# Patient Record
Sex: MALE | Race: WHITE | NOT HISPANIC OR LATINO | URBAN - METROPOLITAN AREA
[De-identification: names, ages, dates, MRNs, and addresses within clinical notes are randomized per-mention and may not be internally consistent; named-entity substitution may affect disease eponyms.]

---

## 2020-06-29 ENCOUNTER — INPATIENT (INPATIENT)
Facility: HOSPITAL | Age: 35
LOS: 0 days | Discharge: LEFT BEFORE DISCHARGE | DRG: 543 | End: 2020-06-29
Attending: HOSPITALIST | Admitting: HOSPITALIST
Payer: COMMERCIAL

## 2020-06-29 ENCOUNTER — EMERGENCY (EMERGENCY)
Facility: HOSPITAL | Age: 35
LOS: 1 days | Discharge: ANOTHER HOSPITAL | End: 2020-06-29
Attending: EMERGENCY MEDICINE | Admitting: EMERGENCY MEDICINE
Payer: COMMERCIAL

## 2020-06-29 VITALS
SYSTOLIC BLOOD PRESSURE: 126 MMHG | DIASTOLIC BLOOD PRESSURE: 84 MMHG | WEIGHT: 199.96 LBS | OXYGEN SATURATION: 100 % | TEMPERATURE: 98 F | HEART RATE: 70 BPM | HEIGHT: 66 IN | RESPIRATION RATE: 20 BRPM

## 2020-06-29 VITALS
SYSTOLIC BLOOD PRESSURE: 125 MMHG | TEMPERATURE: 98 F | OXYGEN SATURATION: 100 % | HEART RATE: 75 BPM | DIASTOLIC BLOOD PRESSURE: 82 MMHG | RESPIRATION RATE: 18 BRPM

## 2020-06-29 VITALS
WEIGHT: 199.96 LBS | OXYGEN SATURATION: 99 % | HEART RATE: 109 BPM | TEMPERATURE: 99 F | RESPIRATION RATE: 16 BRPM | SYSTOLIC BLOOD PRESSURE: 166 MMHG | HEIGHT: 66 IN | DIASTOLIC BLOOD PRESSURE: 88 MMHG

## 2020-06-29 VITALS
HEART RATE: 74 BPM | RESPIRATION RATE: 16 BRPM | TEMPERATURE: 99 F | DIASTOLIC BLOOD PRESSURE: 86 MMHG | SYSTOLIC BLOOD PRESSURE: 143 MMHG | OXYGEN SATURATION: 96 %

## 2020-06-29 DIAGNOSIS — M86.9 OSTEOMYELITIS, UNSPECIFIED: ICD-10-CM

## 2020-06-29 LAB
ALBUMIN SERPL ELPH-MCNC: 3.6 G/DL — SIGNIFICANT CHANGE UP (ref 3.3–5)
ALP SERPL-CCNC: 144 U/L — HIGH (ref 40–120)
ALT FLD-CCNC: 14 U/L — SIGNIFICANT CHANGE UP (ref 10–45)
ANION GAP SERPL CALC-SCNC: 11 MMOL/L — SIGNIFICANT CHANGE UP (ref 5–17)
APTT BLD: 33.2 SEC — SIGNIFICANT CHANGE UP (ref 27.5–35.5)
APTT BLD: 35.4 SEC — SIGNIFICANT CHANGE UP (ref 27.5–35.5)
AST SERPL-CCNC: 13 U/L — SIGNIFICANT CHANGE UP (ref 10–40)
BASOPHILS # BLD AUTO: 0.01 K/UL — SIGNIFICANT CHANGE UP (ref 0–0.2)
BASOPHILS # BLD AUTO: 0.03 K/UL — SIGNIFICANT CHANGE UP (ref 0–0.2)
BASOPHILS NFR BLD AUTO: 0.1 % — SIGNIFICANT CHANGE UP (ref 0–2)
BASOPHILS NFR BLD AUTO: 0.4 % — SIGNIFICANT CHANGE UP (ref 0–2)
BILIRUB SERPL-MCNC: 0.2 MG/DL — SIGNIFICANT CHANGE UP (ref 0.2–1.2)
BLD GP AB SCN SERPL QL: NEGATIVE — SIGNIFICANT CHANGE UP
BUN SERPL-MCNC: 8 MG/DL — SIGNIFICANT CHANGE UP (ref 7–23)
CALCIUM SERPL-MCNC: 9.1 MG/DL — SIGNIFICANT CHANGE UP (ref 8.4–10.5)
CHLORIDE SERPL-SCNC: 101 MMOL/L — SIGNIFICANT CHANGE UP (ref 96–108)
CO2 SERPL-SCNC: 26 MMOL/L — SIGNIFICANT CHANGE UP (ref 22–31)
CREAT SERPL-MCNC: 0.79 MG/DL — SIGNIFICANT CHANGE UP (ref 0.5–1.3)
CRP SERPL-MCNC: 3.11 MG/DL — HIGH (ref 0–0.4)
EOSINOPHIL # BLD AUTO: 0.05 K/UL — SIGNIFICANT CHANGE UP (ref 0–0.5)
EOSINOPHIL # BLD AUTO: 0.08 K/UL — SIGNIFICANT CHANGE UP (ref 0–0.5)
EOSINOPHIL NFR BLD AUTO: 0.6 % — SIGNIFICANT CHANGE UP (ref 0–6)
EOSINOPHIL NFR BLD AUTO: 1 % — SIGNIFICANT CHANGE UP (ref 0–6)
GAS PNL BLDV: SIGNIFICANT CHANGE UP
GLUCOSE SERPL-MCNC: 122 MG/DL — HIGH (ref 70–99)
HCT VFR BLD CALC: 38.7 % — LOW (ref 39–50)
HCT VFR BLD CALC: 40 % — SIGNIFICANT CHANGE UP (ref 39–50)
HGB BLD-MCNC: 11.5 G/DL — LOW (ref 13–17)
HGB BLD-MCNC: 12.2 G/DL — LOW (ref 13–17)
IMM GRANULOCYTES NFR BLD AUTO: 0.3 % — SIGNIFICANT CHANGE UP (ref 0–1.5)
IMM GRANULOCYTES NFR BLD AUTO: 0.4 % — SIGNIFICANT CHANGE UP (ref 0–1.5)
INR BLD: 1.11 RATIO — SIGNIFICANT CHANGE UP (ref 0.88–1.16)
INR BLD: 1.15 RATIO — SIGNIFICANT CHANGE UP (ref 0.88–1.16)
LACTATE SERPL-SCNC: 1.1 MMOL/L — SIGNIFICANT CHANGE UP (ref 0.7–2)
LYMPHOCYTES # BLD AUTO: 1.26 K/UL — SIGNIFICANT CHANGE UP (ref 1–3.3)
LYMPHOCYTES # BLD AUTO: 1.48 K/UL — SIGNIFICANT CHANGE UP (ref 1–3.3)
LYMPHOCYTES # BLD AUTO: 15 % — SIGNIFICANT CHANGE UP (ref 13–44)
LYMPHOCYTES # BLD AUTO: 16.4 % — SIGNIFICANT CHANGE UP (ref 13–44)
MCHC RBC-ENTMCNC: 22.3 PG — LOW (ref 27–34)
MCHC RBC-ENTMCNC: 22.4 PG — LOW (ref 27–34)
MCHC RBC-ENTMCNC: 29.7 GM/DL — LOW (ref 32–36)
MCHC RBC-ENTMCNC: 30.5 GM/DL — LOW (ref 32–36)
MCV RBC AUTO: 73.4 FL — LOW (ref 80–100)
MCV RBC AUTO: 75.1 FL — LOW (ref 80–100)
MONOCYTES # BLD AUTO: 0.42 K/UL — SIGNIFICANT CHANGE UP (ref 0–0.9)
MONOCYTES # BLD AUTO: 0.68 K/UL — SIGNIFICANT CHANGE UP (ref 0–0.9)
MONOCYTES NFR BLD AUTO: 4.6 % — SIGNIFICANT CHANGE UP (ref 2–14)
MONOCYTES NFR BLD AUTO: 8.1 % — SIGNIFICANT CHANGE UP (ref 2–14)
NEUTROPHILS # BLD AUTO: 6.34 K/UL — SIGNIFICANT CHANGE UP (ref 1.8–7.4)
NEUTROPHILS # BLD AUTO: 7.05 K/UL — SIGNIFICANT CHANGE UP (ref 1.8–7.4)
NEUTROPHILS NFR BLD AUTO: 75.1 % — SIGNIFICANT CHANGE UP (ref 43–77)
NEUTROPHILS NFR BLD AUTO: 78 % — HIGH (ref 43–77)
NRBC # BLD: 0 /100 WBCS — SIGNIFICANT CHANGE UP (ref 0–0)
NRBC # BLD: 0 /100 WBCS — SIGNIFICANT CHANGE UP (ref 0–0)
PLATELET # BLD AUTO: 314 K/UL — SIGNIFICANT CHANGE UP (ref 150–400)
PLATELET # BLD AUTO: 325 K/UL — SIGNIFICANT CHANGE UP (ref 150–400)
POTASSIUM SERPL-MCNC: 3.9 MMOL/L — SIGNIFICANT CHANGE UP (ref 3.5–5.3)
POTASSIUM SERPL-SCNC: 3.9 MMOL/L — SIGNIFICANT CHANGE UP (ref 3.5–5.3)
PROT SERPL-MCNC: 7.6 G/DL — SIGNIFICANT CHANGE UP (ref 6–8.3)
PROTHROM AB SERPL-ACNC: 12.9 SEC — SIGNIFICANT CHANGE UP (ref 10.6–13.6)
PROTHROM AB SERPL-ACNC: 13 SEC — SIGNIFICANT CHANGE UP (ref 10.6–13.6)
RBC # BLD: 5.15 M/UL — SIGNIFICANT CHANGE UP (ref 4.2–5.8)
RBC # BLD: 5.45 M/UL — SIGNIFICANT CHANGE UP (ref 4.2–5.8)
RBC # FLD: 15.8 % — HIGH (ref 10.3–14.5)
RBC # FLD: 15.9 % — HIGH (ref 10.3–14.5)
RH IG SCN BLD-IMP: POSITIVE — SIGNIFICANT CHANGE UP
SARS-COV-2 RNA SPEC QL NAA+PROBE: SIGNIFICANT CHANGE UP
SODIUM SERPL-SCNC: 138 MMOL/L — SIGNIFICANT CHANGE UP (ref 135–145)
WBC # BLD: 8.42 K/UL — SIGNIFICANT CHANGE UP (ref 3.8–10.5)
WBC # BLD: 9.04 K/UL — SIGNIFICANT CHANGE UP (ref 3.8–10.5)
WBC # FLD AUTO: 8.42 K/UL — SIGNIFICANT CHANGE UP (ref 3.8–10.5)
WBC # FLD AUTO: 9.04 K/UL — SIGNIFICANT CHANGE UP (ref 3.8–10.5)

## 2020-06-29 PROCEDURE — 99285 EMERGENCY DEPT VISIT HI MDM: CPT | Mod: 25

## 2020-06-29 PROCEDURE — 86900 BLOOD TYPING SEROLOGIC ABO: CPT

## 2020-06-29 PROCEDURE — 80053 COMPREHEN METABOLIC PANEL: CPT

## 2020-06-29 PROCEDURE — 85610 PROTHROMBIN TIME: CPT

## 2020-06-29 PROCEDURE — 85014 HEMATOCRIT: CPT

## 2020-06-29 PROCEDURE — 96375 TX/PRO/DX INJ NEW DRUG ADDON: CPT

## 2020-06-29 PROCEDURE — 84132 ASSAY OF SERUM POTASSIUM: CPT

## 2020-06-29 PROCEDURE — 99285 EMERGENCY DEPT VISIT HI MDM: CPT

## 2020-06-29 PROCEDURE — 93010 ELECTROCARDIOGRAM REPORT: CPT

## 2020-06-29 PROCEDURE — 85027 COMPLETE CBC AUTOMATED: CPT

## 2020-06-29 PROCEDURE — 83605 ASSAY OF LACTIC ACID: CPT

## 2020-06-29 PROCEDURE — 29105 APPLICATION LONG ARM SPLINT: CPT | Mod: LT

## 2020-06-29 PROCEDURE — 82947 ASSAY GLUCOSE BLOOD QUANT: CPT

## 2020-06-29 PROCEDURE — C1751: CPT

## 2020-06-29 PROCEDURE — 86140 C-REACTIVE PROTEIN: CPT

## 2020-06-29 PROCEDURE — 86901 BLOOD TYPING SEROLOGIC RH(D): CPT

## 2020-06-29 PROCEDURE — 73090 X-RAY EXAM OF FOREARM: CPT | Mod: 26,LT,77

## 2020-06-29 PROCEDURE — 96374 THER/PROPH/DIAG INJ IV PUSH: CPT

## 2020-06-29 PROCEDURE — 36556 INSERT NON-TUNNEL CV CATH: CPT

## 2020-06-29 PROCEDURE — 73080 X-RAY EXAM OF ELBOW: CPT | Mod: 26,LT

## 2020-06-29 PROCEDURE — 86850 RBC ANTIBODY SCREEN: CPT

## 2020-06-29 PROCEDURE — 90715 TDAP VACCINE 7 YRS/> IM: CPT

## 2020-06-29 PROCEDURE — 90471 IMMUNIZATION ADMIN: CPT

## 2020-06-29 PROCEDURE — 85652 RBC SED RATE AUTOMATED: CPT

## 2020-06-29 PROCEDURE — 85730 THROMBOPLASTIN TIME PARTIAL: CPT

## 2020-06-29 PROCEDURE — 71045 X-RAY EXAM CHEST 1 VIEW: CPT | Mod: 26

## 2020-06-29 PROCEDURE — 82435 ASSAY OF BLOOD CHLORIDE: CPT

## 2020-06-29 PROCEDURE — 73090 X-RAY EXAM OF FOREARM: CPT

## 2020-06-29 PROCEDURE — U0003: CPT

## 2020-06-29 PROCEDURE — G0378: CPT

## 2020-06-29 PROCEDURE — 82330 ASSAY OF CALCIUM: CPT

## 2020-06-29 PROCEDURE — 71045 X-RAY EXAM CHEST 1 VIEW: CPT | Mod: 26,77

## 2020-06-29 PROCEDURE — 87040 BLOOD CULTURE FOR BACTERIA: CPT

## 2020-06-29 PROCEDURE — 82803 BLOOD GASES ANY COMBINATION: CPT

## 2020-06-29 PROCEDURE — 73090 X-RAY EXAM OF FOREARM: CPT | Mod: 26,50,77

## 2020-06-29 PROCEDURE — 99283 EMERGENCY DEPT VISIT LOW MDM: CPT

## 2020-06-29 PROCEDURE — 93005 ELECTROCARDIOGRAM TRACING: CPT

## 2020-06-29 PROCEDURE — 71045 X-RAY EXAM CHEST 1 VIEW: CPT

## 2020-06-29 PROCEDURE — 36415 COLL VENOUS BLD VENIPUNCTURE: CPT

## 2020-06-29 PROCEDURE — 73080 X-RAY EXAM OF ELBOW: CPT

## 2020-06-29 PROCEDURE — 84295 ASSAY OF SERUM SODIUM: CPT

## 2020-06-29 RX ORDER — PIPERACILLIN AND TAZOBACTAM 4; .5 G/20ML; G/20ML
3.38 INJECTION, POWDER, LYOPHILIZED, FOR SOLUTION INTRAVENOUS ONCE
Refills: 0 | Status: COMPLETED | OUTPATIENT
Start: 2020-06-29 | End: 2020-06-29

## 2020-06-29 RX ORDER — AZTREONAM 2 G
1 VIAL (EA) INJECTION
Qty: 20 | Refills: 0
Start: 2020-06-29 | End: 2020-07-08

## 2020-06-29 RX ORDER — SODIUM CHLORIDE 9 MG/ML
2200 INJECTION INTRAMUSCULAR; INTRAVENOUS; SUBCUTANEOUS ONCE
Refills: 0 | Status: COMPLETED | OUTPATIENT
Start: 2020-06-29 | End: 2020-06-29

## 2020-06-29 RX ORDER — VANCOMYCIN HCL 1 G
1000 VIAL (EA) INTRAVENOUS ONCE
Refills: 0 | Status: COMPLETED | OUTPATIENT
Start: 2020-06-29 | End: 2020-06-29

## 2020-06-29 RX ORDER — SODIUM CHLORIDE 9 MG/ML
1000 INJECTION INTRAMUSCULAR; INTRAVENOUS; SUBCUTANEOUS ONCE
Refills: 0 | Status: COMPLETED | OUTPATIENT
Start: 2020-06-29 | End: 2020-06-29

## 2020-06-29 RX ORDER — TETANUS TOXOID, REDUCED DIPHTHERIA TOXOID AND ACELLULAR PERTUSSIS VACCINE, ADSORBED 5; 2.5; 8; 8; 2.5 [IU]/.5ML; [IU]/.5ML; UG/.5ML; UG/.5ML; UG/.5ML
0.5 SUSPENSION INTRAMUSCULAR ONCE
Refills: 0 | Status: COMPLETED | OUTPATIENT
Start: 2020-06-29 | End: 2020-06-29

## 2020-06-29 RX ORDER — ACETAMINOPHEN 500 MG
650 TABLET ORAL ONCE
Refills: 0 | Status: COMPLETED | OUTPATIENT
Start: 2020-06-29 | End: 2020-06-29

## 2020-06-29 RX ADMIN — Medication 650 MILLIGRAM(S): at 16:11

## 2020-06-29 RX ADMIN — TETANUS TOXOID, REDUCED DIPHTHERIA TOXOID AND ACELLULAR PERTUSSIS VACCINE, ADSORBED 0.5 MILLILITER(S): 5; 2.5; 8; 8; 2.5 SUSPENSION INTRAMUSCULAR at 16:11

## 2020-06-29 RX ADMIN — PIPERACILLIN AND TAZOBACTAM 200 GRAM(S): 4; .5 INJECTION, POWDER, LYOPHILIZED, FOR SOLUTION INTRAVENOUS at 17:40

## 2020-06-29 RX ADMIN — Medication 250 MILLIGRAM(S): at 18:10

## 2020-06-29 RX ADMIN — SODIUM CHLORIDE 1000 MILLILITER(S): 9 INJECTION INTRAMUSCULAR; INTRAVENOUS; SUBCUTANEOUS at 19:53

## 2020-06-29 RX ADMIN — SODIUM CHLORIDE 2200 MILLILITER(S): 9 INJECTION INTRAMUSCULAR; INTRAVENOUS; SUBCUTANEOUS at 17:40

## 2020-06-29 NOTE — ED ADULT NURSE REASSESSMENT NOTE - NS ED NURSE REASSESS COMMENT FT1
Triple lumen to right side neck placed by KATHE Moffett. Patient will be transferred to [facility] . Report given to nurse Hung

## 2020-06-29 NOTE — ED PROVIDER NOTE - PROVIDER TOKENS
PROVIDER:[TOKEN:[[id number]],FOLLOWUP:[2 weeks]],PROVIDER:[TOKEN:[[id number]],FOLLOWUP:[1 week]]

## 2020-06-29 NOTE — ED PROVIDER NOTE - CARE PROVIDER_API CALL
Chapo Farah  PLASTIC SURGERY  [address]  [place] [address]  Phone: [phone]  Fax: [phone]  Follow Up Time: 2 weeks    Jignesh Stoddard  ORTHOPAEDIC SURGERY  [address], [address]  [place] [address]  Phone: [phone]  Fax: [phone]  Follow Up Time: 1 week

## 2020-06-29 NOTE — ED PROVIDER NOTE - ATTENDING CONTRIBUTION TO CARE
36 yo M with hx Heroin abuse p/w chronic L arm wound x past 2 - 5 years. Now pt developed fx to forearm this week. No numbness / focal weakness - chronic weakness though to hand. No fever/chills. Pt still using heroin. No cp/sob/palp. no fever/chills. no prox swelling / redness. no other acute co.  Exam: MM Moist. L arm with ~10 x 6 open deep wound through muscle with necrosis and visible bone. dec radial sens distally. otherwise from, mild hand swelling. nl prox forearm / humerus.  Check labs, xr, ortho , vasc, plastics - TBA

## 2020-06-29 NOTE — ED PROVIDER NOTE - CARE PLAN
Principal Discharge DX:	Pathologic fracture of bone of forearm, initial encounter  Secondary Diagnosis:	Infected wound

## 2020-06-29 NOTE — ED ADULT NURSE NOTE - CHIEF COMPLAINT QUOTE
Pt is IVDA and w2as injecting into an open wound on the left arm. Progressing necrosis to area leading to radial and ulnar fracture. Transfer from [place] for evaluation and possible amputation.

## 2020-06-29 NOTE — ED PROVIDER NOTE - OBJECTIVE STATEMENT
pt is a 36yo male with pmhx of IVDU presents with arm wound x months. pt reports left arm pain with possible infection with exposed bone. pt reports he shoots heroin multiple times a day in the area for the past 5 years causing open wound. pt reports he felt his bone "break" last pm. pt reports he never saw a doctor for wound. pt denies fever, cp, sob.

## 2020-06-29 NOTE — SBIRT NOTE ADULT - NSSBIRTDRGWITHDRSX_GEN_A_CORE
Yes/Pt reported that it is hard for him to stop using due to the severity of his withdrawal symptoms.

## 2020-06-29 NOTE — ED PROVIDER NOTE - PHYSICAL EXAMINATION
PGY2/MD Marlen.   VITALS: reviewed  GEN: No apparent distress, A & O x 4  HEAD/EYES: NC/AT, PERRL, EOMI, anicteric sclerae, no conjunctival pallor  ENT: mucus membranes moist, oropharynx WNL, trachea midline, no JVD, neck is supple  RESP: lungs CTA with equal breath sounds bilaterally, chest wall nontender and atraumatic  CV: heart with reg rhythm S1, S2, no murmur  ABDOMEN: normoactive bowel sounds, soft, nondistended, nontender  MSK: extremities atraumatic and nontender, no edema, no asymmetry. + left forearm, open wound, purulent dischage  SKIN: warm, dry, no rash, no bruising, no cyanosis. color appropriate for ethnicity  NEURO: alert, mentating appropriately, no facial asymmetry.  PSYCH: Affect appropriate

## 2020-06-29 NOTE — ED ADULT NURSE NOTE - CHPI ED NUR SYMPTOMS NEG
no pain/no vomiting/no purulent drainage/no redness/no fever/no bleeding at site/no drainage/no blood in mucus/no chills/no rectal pain

## 2020-06-29 NOTE — CONSULT NOTE ADULT - SUBJECTIVE AND OBJECTIVE BOX
HPI:  36 yo RHD Male presents to  ED c/o severe L forearm pain. He reports that he has a 10 year heroin abuse history. 5 years into his dorsal left forearm. He has noticed an infection present at the site and has been injecting through the infection. He reports that 2 days ago he felt an electricity like feeling and new his arm was broken. Denies any specific trauma. Uses reportedly 25 bags of heroin a day (5 bags x5 times a day) HPI:  34 yo RHD Male presents to [facility] ED c/o severe L forearm pain, as a transfer from [facility]. He reports that he has a 10 year heroin abuse history. 5 years into his dorsal left forearm. He has noticed an infection present at the site and has been injecting through the infection. He reports that 2 days ago he felt an electricity like feeling and now his arm was broken. Denies any specific trauma. Uses reportedly 25 bags of heroin a day (5 bags x5 times a day). Pt was placed in volar slab in [facility].    PAST MEDICAL & SURGICAL HISTORY:  No pertinent past medical history  No significant past surgical history    Home Medications:  See med rec    Allergies    No Known Allergies    Intolerances    Vital Signs Last 24 Hrs  T(C): 36.7 (29 Jun 2020 18:49), Max: 36.7 (29 Jun 2020 18:49)  T(F): 98.1 (29 Jun 2020 18:49), Max: 98.1 (29 Jun 2020 18:49)  HR: 70 (29 Jun 2020 18:49) (70 - 70)  BP: 126/84 (29 Jun 2020 18:49) (126/84 - 126/84)  BP(mean): --  RR: 20 (29 Jun 2020 18:49) (20 - 20)  SpO2: 100% (29 Jun 2020 18:49) (100% - 100%)    PE:  LUE:  dorsal sided eschar abscess with exposed boned noted, significant soft tissue swelling, no ecchymosis; Inability to raise the thumb, extend wrist or MCPS. Volarly able to flex all digits, AIn/Ulnar/musc/median nerve present, PIN/Radial nerve out. cap refill < 2seconds     SECONDARY EXAM: Benign, Skin intact, SILT throughout, motor grossly intact throughout, no other orthopedic injuries at this time, compartments soft and compressible    SPINE: Skin intact, no bony tenderness or step-offs appreciated throughout cervical/thoracic/lumbar/sacral spine    RUE: Skin intact, lesion noted dorsal forearm from track sites no erythema, ecchymosis, edema, gross deformity, NTTP over the bony prominences of the shoulder/elbow/wrist/hand, painless passive/active ROM of the shoulder/elbow/wrist/hand, C5-T1 SILT, motor grossly intact throughout axillary/musculocutaenous/radial/median/ulnar nerves, + radial pulse    B/L LE: Skin intact, no erythema, ecchymosis, edema, gross deformity, NTTP over the bony prominences of the hip/knee/ankle/foot, painless passive/active ROM of the hip/knee/ankle/foot, L2-S1 SILT, motor grossly intact throughout hip flexors/quads/hams/TA/EHL/FHL/GSC, + DP/PT pulses, no pain with log roll, no pain on axial loading, compartments soft and compressible, calves nontender      Imaging:  XR L forearm: both bone fx present in setting of likely osteomyelitis  XR R forearm: no specific bony changes noted suggestive of OM

## 2020-06-29 NOTE — ED ADULT NURSE REASSESSMENT NOTE - NS ED NURSE REASSESS COMMENT FT1
Marlen AEKRS removed left IJ central line, pressure applied, no active bleeding, pt reports no dizziness/pain. discharge paperwork reviewed with patient by MD, pt verbalizes understanding. VSS.

## 2020-06-29 NOTE — ED PROVIDER NOTE - CLINICAL SUMMARY MEDICAL DECISION MAKING FREE TEXT BOX
pt with left ue wound appears necrotic with probable osteo. will do labs per sepsis protocol, x rays, ekg, ivf, abx, consult ortho/vascular and plastics.

## 2020-06-29 NOTE — ED PROVIDER NOTE - PROGRESS NOTE DETAILS
PGY2/MD Marlen. JENNY Evans, scheduled at 12a. admission to medicine for osteomylitis Patient desires to leave emergency department against medical advice, prior to completion of my desired evaluation and treatment plan.  Patient's mental status is normal, patient is fully alert and oriented x person, place and time.  Patient demonstrates clear reasoning capabilities and capacity to make this decision.  Patient fully understands the explained risks involved with this decision including worsening of medical condition, missed and or delayed diagnosis, permanent disability and death.  All alternative options given to patient and still desires discharge against medical advice from ED and will follow up as an outpatient.  Patient given the option to return to ED at any time to have further evaluation and treatment. Patient desires to leave emergency department against medical advice, prior to completion of my desired evaluation and treatment plan.  He was admitted to the hospital for treatment of his osteomyelitis but he states he doesn't want to be here, he doesn't want to have an amputation of the L arm, he was offerred additional alternatives which included iv antibiotics, but he doesn't want to stay Patient's mental status is normal, patient is fully alert and oriented x person, place and time.  Patient demonstrates clear reasoning capabilities and capacity to make this decision.  Patient fully understands the explained risks involved with this decision including worsening of medical condition, missed and or delayed diagnosis, permanent disability and death.  All alternative options given to patient and still desires discharge against medical advice from ED and will follow up as an outpatient.  Patient given the option to return to ED at any time to have further evaluation and treatment.

## 2020-06-29 NOTE — CONSULT NOTE ADULT - ASSESSMENT
A/P: 36 yo M with left ulna and radius fx 2/2 to OM:   -Due to extent of obvious OM, and superficial abscess pt not a surgical candidate for hardware at this time  -Pain control  -ID c/s  -Plastics consult for wound coverage  -Recommend left below elbow amputation, Vascular consult for possible amputation  -NWB L UE volar slab with sling  -Keep splint clean/dry/intact;  -Encourage active finger motion to help with swelling  -Pt aware of possible need for surgical intervention for possible amputation versus limb salvage  -Medical admission for IV abx  -No acute orthopaedic surgical intervention at this time.  -Outpatient follow up with attending on call, Dr. Stoddard in 1-2 weeks following discharge. Please call office for an appointment.  -Ortho stable for discharge  -discussed with Dr. Stoddard, aware and in agreement with above plan

## 2020-06-29 NOTE — ED PROVIDER NOTE - OBJECTIVE STATEMENT
PGY2/MD Marlen. 34 yo M with h/o IV drug abuse, chronic fore arm infectiouns wound, presented to the [facility] ED for broken arm. transferred for chronic osteomylitis, possibly fore arm amputation. No sings of sepsis, zosyn, vcm given at 4p.

## 2020-06-29 NOTE — ED PROVIDER NOTE - NSFOLLOWUPINSTRUCTIONS_ED_ALL_ED_FT
You were seen in the emergency department today and we offered you admission to the hospital however you declined.     Please follow up with your surgeons (plastics, orthopedics and vascular surgery) for further management of your symptoms.    If you still have persistent pain after 5-7 days after the injury please be re-evaluated as there could be a more severe diagnosis. If you develop numbness, weakness, change in color of your extremities (turn blue or white), worsening pain please seek immediate medical care for repeat evaluation.

## 2020-06-29 NOTE — ED ADULT TRIAGE NOTE - CHIEF COMPLAINT QUOTE
Pt is IVDA and w2as injecting into an open wound on the left arm. Progressing necrosis to area leading to radial and ulnar fracture. Transfer from [place] for evaluation and possible amputation.

## 2020-06-29 NOTE — ED ADULT NURSE NOTE - OBJECTIVE STATEMENT
35 year old male presented to ED via EMS from [place] with c/o of left radius and ulna fracture starting Saturday. Fracture was spontaneous due to patient injecting Heroin into necrotizing wound to left arm. Left IJ central line placed today at [place], arrived at [facility] ED receiving Vancomycin. Pt received Zosyn at [place]. pt denies pain. pt denies CP, SOB, nausea/vomiting, numbness/tingling, fever, cough, chills, dizziness, headache, blurred vision, neuro intact. pt a&ox3, lung sounds clear, heart rate regular, abdomen soft nontender nondistended to palp. skin intact except for left arm. IJ patent with positive blood return. Will continue to monitor and assess while offering support and reassurance.

## 2020-06-29 NOTE — PROCEDURE NOTE - ADDITIONAL PROCEDURE DETAILS
35 yr Male with PMHx of IVDU with recent usage this a.m. wo presents to E.D. with Left posterior forearm 6x3x1 inch open wound with exposed bone. Several peripheral IV attempts with and without POCUS unsuccessful. Left IJ TLC placed for IV access for IV fluid and antibx use. 35 yr Male with PMHx of IVDU with recent usage this a.m. wo presents to E.D. with Left posterior forearm 6x3x1 inch open wound with exposed bone. Several peripheral IV attempts (5 attempts in total) with and without POCUS unsuccessful. Left IJ TLC placed for IV access for IV fluid and antibx use.

## 2020-06-29 NOTE — ED ADULT NURSE NOTE - OBJECTIVE STATEMENT
Received the patient in the Er. Patient  is alert and oriented. AS per patient he is being using IV heroin for 10 years. Patient has a open wound with necrosis on left arm for 5 years, As per patient he is being using the same wound for heroin. . unable to obtain IV assesses on the patient. Dr. Ramos and KVNG Cevallos is aware of the IV. ICu will be coming to insert IV.

## 2020-06-29 NOTE — CONSULT NOTE ADULT - SUBJECTIVE AND OBJECTIVE BOX
36 yo Male presents to  ED c/o severe L forearm pain. He reports that he has a 10 year heroin abuse history. 5 years into his dorsal left forearm. he has noticed an infection present at the site and has been injecting through the infection. He reporst that 2 days ago he felt an electricity like feeling and new his arm was broken. denies any specific trauma. RHD.  Uses reportedly 25 bags of heroin a day (5 bags x5 tmies aday)    PMH:  Anemia    PSH:  No significant past surgical history    AH:    Meds: See med rec    T(C): 37 (06-29-20 @ 15:08)  HR: 109 (06-29-20 @ 15:08)  BP: 166/88 (06-29-20 @ 15:08)  RR: 16 (06-29-20 @ 15:08)  SpO2: 99% (06-29-20 @ 15:08)  Wt(kg): --    PE L UE:  dorsal sided eschar abscess with exposed boned noted, significant soft tissue swelling, no ecchymosis; Inabilty to raise the thumb, extend wrist or MCPS. Volarly able to flex all digits, AIn/Ulnar/musc/median nerve present, PIN/Radial nerve out. cap refill < 2seconds     SECONDARY EXAM: Benign, Skin intact, SILT throughout, motor grossly intact throughout, no other orthopedic injuries at this time, compartments soft and compressible    SPINE: Skin intact, no bony tenderness or step-offs appreciated throughout cervical/thoracic/lumbar/sacral spine    R UE: Skin intact, lesion noted dorsal forearm from track sites no erythema, ecchymosis, edema, gross deformity, NTTP over the bony prominences of the shoulder/elbow/wrist/hand, painless passive/active ROM of the shoulder/elbow/wrist/hand, C5-T1 SILT, motor grossly intact throughout axillary/musculocutaenous/radial/median/ulnar nerves, + radial pulse    B/L LE: Skin intact, no erythema, ecchymosis, edema, gross deformity, NTTP over the bony prominences of the hip/knee/ankle/foot, painless passive/active ROM of the hip/knee/ankle/foot, L2-S1 SILT, motor grossly intact throughout hip flexors/quads/hams/TA/EHL/FHL/GSC, + DP/PT pulses, no pain with log roll, no pain on axial loading, compartments soft and compressible, calves nontender      Imaging:  XR l forearm: both bone fx present in setting of likely osteomyeltiis  XR R forearm: no specific bony changes noted suggestive of OM    Procedure Note:  After verbal consent obtained, volar slab splint applied to Forearm/Wrist and mold held. AZ XR obtained which show improved alignment Pt NVI post procedure. Pt tolerated procedure well.    A/P: 36 yo M with forearm fx 2/2 to OM  -Due to extent of obvious OM, and superficial abscess pt not a surgical candidate for hardware at this time  - Pain control  -ID c/s  -Plastics consult for wound coverage  -Vascular consult for possible amputation  - NWB L UE volar slab with sling  - Keep splint clean/dry/intact;  -FU post reduction xrays  - Encourage active finger motion to help with swelling  - Pt aware of possible need for surgical intervention for possible amputation versus limb salvage  -Medical admission for IV abx  -No acute orthopaedic surgical intervention at this time.  -Outpatient follow up with attending on call, Dr. Diallo in 1-2 weeks following discharge. Please call office for an appointment.  -Ortho stable for discharge. Please reconsult PRN

## 2020-06-29 NOTE — ED ADULT NURSE REASSESSMENT NOTE - NS ED NURSE REASSESS COMMENT FT1
pt states 'I do not want to go to holding, I want to go home. I talked with my surgeon, and I want to leave'. ED Attending Ancelmo aware, discussed with patient plan of care and reason for admission, pt a&ox3 and refused admission.

## 2020-06-29 NOTE — PROCEDURE NOTE - NSPOSTPRCRAD_GEN_A_CORE
central line located in the/Guidewire ascertained in Left IJ via POCUS, catheter filtered over guidewire, guidewire removed in entirety. Lung sliding ascertained via POCUS

## 2020-06-29 NOTE — ED PROVIDER NOTE - ATTENDING CONTRIBUTION TO CARE
35 M w/ hx of heroin abuse, presents to the ED w/ a forearm fx due to om, pt reports wound opened on Sunday. He presented to [facility] and was started on antibiotics, and orthopedics was consulted and was splinted and sent to [facility] for plastics and vascular sx consult. Ortho will also be consulted to ensure splint is appropriately applied.

## 2020-06-29 NOTE — ED PROVIDER NOTE - PATIENT PORTAL LINK FT
You can access the FollowMyHealth Patient Portal offered by [facility] by registering at the following website: [email or web address]. By joining [employer]’s FollowMyHealth portal, you will also be able to view your health information using other applications (apps) compatible with our system.

## 2020-06-29 NOTE — SBIRT NOTE ADULT - NSSBIRTDRGSTOPUSE_GEN_A_CORE
Pt reported that due to the severity of his use, he feels that he would only be able to stop using if he had assistance to manage the withdrawal symptoms. Pt stated that he has tried Suboxone which was not a good treatment for him and methadone treatments before. Pt reported that he had a period where he was clean from use approximately 6 years ago./No

## 2020-06-29 NOTE — ED PROVIDER NOTE - CARE PLAN
Principal Discharge DX:	Osteomyelitis Principal Discharge DX:	Radius fracture  Secondary Diagnosis:	Osteomyelitis

## 2020-06-29 NOTE — ED PROVIDER NOTE - NSFOLLOWUPCLINICS_GEN_ALL_ED_FT
[facility] Vascular Surgery  Vascular Surgery  [address] [place] [address]  Phone: [phone]  Fax:     [facility]  General Surgery  [address] [place] [address]  Phone: [phone]  Fax:   Follow Up Time:

## 2020-06-29 NOTE — ED PROVIDER NOTE - PROGRESS NOTE DETAILS
[facility] - unable to care for this, unable to do aputation at [facility]. [facility] - will need [facility] center   Called [facility] xfer center - [facility] - Vascular, Dr Fuentes Ortho - states should call med - not primary vascular or ortho issue. [facility] Med Dr Leo - sent pt to ed, they will consult on pt to decide on proper dispo service. [facility] ED Dr Ag - accepts xfer to ed. [facility] - unable to care for this, unable to do amputation at [facility]. [facility] ([facility]) - will need tertiary care center   Called [facility] center - Dw Dr Montanez - Vascular, Dr Fuentes Ortho - states should call med - not primary vascular or ortho issue. Dw Med Dr Leo - sent pt to ed, they will consult on pt to decide on proper dispo service. Dw ED Dr Ag - accepts xfer to ed. unable to get IV access in ed. consulted peter [facility] ICU advised will do central line. risks and benefits discussed with pt who agrees with procedure. ortho consulted dr francis advised pt needs vascular eval. pt splinted by ortho. discussed need for transfer with pt who agrees.

## 2020-06-29 NOTE — ED PROVIDER NOTE - MUSCULOSKELETAL MINIMAL EXAM
+ left ue with large necrotic wound forearm dorsum with exposed bone, muscle and fat decreased rom of hand. sensation intact + 2 radial cap refill < 2 seconds. + left ue with large necrotic wound forearm dorsum with exposed bone, muscle  with eschar decreased rom of hand. sensation intact + 2 radial cap refill < 2 seconds.

## 2020-06-30 LAB
CRP SERPL-MCNC: 3.58 MG/DL — HIGH (ref 0–0.4)
ERYTHROCYTE [SEDIMENTATION RATE] IN BLOOD: 120 MM/HR — HIGH (ref 0–15)
SARS-COV-2 RNA SPEC QL NAA+PROBE: SIGNIFICANT CHANGE UP

## 2020-07-01 NOTE — ED POST DISCHARGE NOTE - DETAILS
called pt to follow up visit. per chart pt left [facility] ama. no answer. gage harry

## 2020-07-04 LAB
CULTURE RESULTS: SIGNIFICANT CHANGE UP
CULTURE RESULTS: SIGNIFICANT CHANGE UP
SPECIMEN SOURCE: SIGNIFICANT CHANGE UP
SPECIMEN SOURCE: SIGNIFICANT CHANGE UP

## 2020-07-08 ENCOUNTER — APPOINTMENT (OUTPATIENT)
Dept: ORTHOPEDIC SURGERY | Facility: CLINIC | Age: 35
End: 2020-07-08
Payer: MEDICAID

## 2020-07-08 VITALS
HEART RATE: 80 BPM | SYSTOLIC BLOOD PRESSURE: 120 MMHG | WEIGHT: 200 LBS | DIASTOLIC BLOOD PRESSURE: 80 MMHG | BODY MASS INDEX: 32.14 KG/M2 | HEIGHT: 66 IN

## 2020-07-08 VITALS — TEMPERATURE: 97 F

## 2020-07-08 PROCEDURE — 99203 OFFICE O/P NEW LOW 30 MIN: CPT

## 2020-07-08 RX ORDER — SULFAMETHOXAZOLE AND TRIMETHOPRIM 800; 160 MG/1; MG/1
800-160 TABLET ORAL TWICE DAILY
Qty: 60 | Refills: 0 | Status: ACTIVE | COMMUNITY
Start: 2020-07-08 | End: 1900-01-01

## 2020-07-08 RX ORDER — CLINDAMYCIN HYDROCHLORIDE 300 MG/1
300 CAPSULE ORAL 3 TIMES DAILY
Qty: 180 | Refills: 0 | Status: ACTIVE | COMMUNITY
Start: 2020-07-08 | End: 1900-01-01

## 2020-07-27 ENCOUNTER — RESULT REVIEW (OUTPATIENT)
Age: 35
End: 2020-07-27

## 2020-07-27 ENCOUNTER — OUTPATIENT (OUTPATIENT)
Dept: OUTPATIENT SERVICES | Facility: HOSPITAL | Age: 35
LOS: 1 days | End: 2020-07-27
Payer: COMMERCIAL

## 2020-07-27 ENCOUNTER — APPOINTMENT (OUTPATIENT)
Dept: MRI IMAGING | Facility: CLINIC | Age: 35
End: 2020-07-27
Payer: MEDICAID

## 2020-07-27 DIAGNOSIS — I96 GANGRENE, NOT ELSEWHERE CLASSIFIED: ICD-10-CM

## 2020-07-27 DIAGNOSIS — M86.432: ICD-10-CM

## 2020-07-27 DIAGNOSIS — S52.502N: ICD-10-CM

## 2020-07-27 PROCEDURE — 73218 MRI UPPER EXTREMITY W/O DYE: CPT | Mod: 26,LT

## 2020-07-27 PROCEDURE — 73218 MRI UPPER EXTREMITY W/O DYE: CPT

## 2020-07-30 ENCOUNTER — APPOINTMENT (OUTPATIENT)
Dept: ORTHOPEDIC SURGERY | Facility: CLINIC | Age: 35
End: 2020-07-30
Payer: MEDICAID

## 2020-07-30 VITALS
BODY MASS INDEX: 32.14 KG/M2 | HEIGHT: 66 IN | DIASTOLIC BLOOD PRESSURE: 83 MMHG | WEIGHT: 200 LBS | HEART RATE: 76 BPM | SYSTOLIC BLOOD PRESSURE: 155 MMHG

## 2020-07-30 VITALS — TEMPERATURE: 97.3 F

## 2020-07-30 DIAGNOSIS — F17.200 NICOTINE DEPENDENCE, UNSPECIFIED, UNCOMPLICATED: ICD-10-CM

## 2020-07-30 DIAGNOSIS — Z82.49 FAMILY HISTORY OF ISCHEMIC HEART DISEASE AND OTHER DISEASES OF THE CIRCULATORY SYSTEM: ICD-10-CM

## 2020-07-30 PROCEDURE — 99214 OFFICE O/P EST MOD 30 MIN: CPT

## 2020-08-04 ENCOUNTER — RX RENEWAL (OUTPATIENT)
Age: 35
End: 2020-08-04

## 2020-08-06 RX ORDER — SULFAMETHOXAZOLE AND TRIMETHOPRIM 800; 160 MG/1; MG/1
800-160 TABLET ORAL
Qty: 56 | Refills: 0 | Status: ACTIVE | COMMUNITY
Start: 2020-08-06 | End: 1900-01-01

## 2020-08-06 RX ORDER — CLINDAMYCIN HYDROCHLORIDE 300 MG/1
300 CAPSULE ORAL 3 TIMES DAILY
Qty: 84 | Refills: 0 | Status: ACTIVE | COMMUNITY
Start: 2020-08-06 | End: 1900-01-01

## 2020-08-18 ENCOUNTER — APPOINTMENT (OUTPATIENT)
Dept: PLASTIC SURGERY | Facility: CLINIC | Age: 35
End: 2020-08-18
Payer: MEDICAID

## 2020-08-18 VITALS
BODY MASS INDEX: 32.14 KG/M2 | HEIGHT: 66 IN | WEIGHT: 200 LBS | SYSTOLIC BLOOD PRESSURE: 134 MMHG | HEART RATE: 87 BPM | TEMPERATURE: 98.7 F | DIASTOLIC BLOOD PRESSURE: 76 MMHG

## 2020-08-18 PROCEDURE — 99244 OFF/OP CNSLTJ NEW/EST MOD 40: CPT

## 2020-08-24 NOTE — CONSULT LETTER
[Dear  ___] : Dear  [unfilled], [Consult Letter:] : I had the pleasure of evaluating your patient, [unfilled]. [Please see my note below.] : Please see my note below. [Consult Closing:] : Thank you very much for allowing me to participate in the care of this patient.  If you have any questions, please do not hesitate to contact me. [Sincerely,] : Sincerely, [FreeTextEntry3] : Carmelo Kumar MD

## 2020-09-08 RX ORDER — CLINDAMYCIN HYDROCHLORIDE 300 MG/1
300 CAPSULE ORAL 3 TIMES DAILY
Qty: 84 | Refills: 0 | Status: ACTIVE | COMMUNITY
Start: 2020-09-08 | End: 1900-01-01

## 2020-09-08 RX ORDER — SULFAMETHOXAZOLE AND TRIMETHOPRIM 800; 160 MG/1; MG/1
800-160 TABLET ORAL
Qty: 56 | Refills: 0 | Status: ACTIVE | COMMUNITY
Start: 2020-09-08 | End: 1900-01-01

## 2020-09-14 ENCOUNTER — OUTPATIENT (OUTPATIENT)
Dept: OUTPATIENT SERVICES | Facility: HOSPITAL | Age: 35
LOS: 1 days | End: 2020-09-14

## 2020-09-14 ENCOUNTER — APPOINTMENT (OUTPATIENT)
Dept: CT IMAGING | Facility: CLINIC | Age: 35
End: 2020-09-14

## 2020-09-14 DIAGNOSIS — M86.432: ICD-10-CM

## 2020-11-17 ENCOUNTER — APPOINTMENT (OUTPATIENT)
Dept: ORTHOPEDIC SURGERY | Facility: CLINIC | Age: 35
End: 2020-11-17
Payer: MEDICAID

## 2020-11-17 PROCEDURE — 99214 OFFICE O/P EST MOD 30 MIN: CPT

## 2020-11-17 RX ORDER — SULFAMETHOXAZOLE AND TRIMETHOPRIM 800; 160 MG/1; MG/1
800-160 TABLET ORAL
Qty: 56 | Refills: 0 | Status: ACTIVE | COMMUNITY
Start: 2020-10-15 | End: 1900-01-01

## 2020-11-17 RX ORDER — CLINDAMYCIN HYDROCHLORIDE 300 MG/1
300 CAPSULE ORAL 3 TIMES DAILY
Qty: 84 | Refills: 0 | Status: ACTIVE | COMMUNITY
Start: 2020-10-15 | End: 1900-01-01

## 2020-12-08 ENCOUNTER — APPOINTMENT (OUTPATIENT)
Dept: CT IMAGING | Facility: IMAGING CENTER | Age: 35
End: 2020-12-08

## 2021-01-05 ENCOUNTER — APPOINTMENT (OUTPATIENT)
Dept: CT IMAGING | Facility: IMAGING CENTER | Age: 36
End: 2021-01-05

## 2021-01-07 RX ORDER — SULFAMETHOXAZOLE AND TRIMETHOPRIM 800; 160 MG/1; MG/1
800-160 TABLET ORAL
Qty: 56 | Refills: 0 | Status: ACTIVE | COMMUNITY
Start: 2021-01-07 | End: 1900-01-01

## 2021-01-07 RX ORDER — CLINDAMYCIN HYDROCHLORIDE 300 MG/1
300 CAPSULE ORAL 3 TIMES DAILY
Qty: 84 | Refills: 0 | Status: ACTIVE | COMMUNITY
Start: 2021-01-07 | End: 1900-01-01

## 2021-01-12 ENCOUNTER — RESULT REVIEW (OUTPATIENT)
Age: 36
End: 2021-01-12

## 2021-01-12 ENCOUNTER — OUTPATIENT (OUTPATIENT)
Dept: OUTPATIENT SERVICES | Facility: HOSPITAL | Age: 36
LOS: 1 days | End: 2021-01-12
Payer: COMMERCIAL

## 2021-01-12 ENCOUNTER — APPOINTMENT (OUTPATIENT)
Dept: CT IMAGING | Facility: IMAGING CENTER | Age: 36
End: 2021-01-12
Payer: MEDICAID

## 2021-01-12 DIAGNOSIS — M86.432: ICD-10-CM

## 2021-01-12 PROCEDURE — 73702 CT LWR EXTREMITY W/O&W/DYE: CPT | Mod: 26,LT

## 2021-01-12 PROCEDURE — 73702 CT LWR EXTREMITY W/O&W/DYE: CPT

## 2021-02-22 ENCOUNTER — APPOINTMENT (OUTPATIENT)
Dept: ORTHOPEDIC SURGERY | Facility: CLINIC | Age: 36
End: 2021-02-22
Payer: MEDICAID

## 2021-02-22 RX ORDER — CLINDAMYCIN HYDROCHLORIDE 300 MG/1
300 CAPSULE ORAL 3 TIMES DAILY
Qty: 84 | Refills: 0 | Status: ACTIVE | COMMUNITY
Start: 2021-02-22 | End: 1900-01-01

## 2021-03-16 ENCOUNTER — APPOINTMENT (OUTPATIENT)
Dept: ORTHOPEDIC SURGERY | Facility: CLINIC | Age: 36
End: 2021-03-16
Payer: MEDICAID

## 2021-03-16 PROCEDURE — 99214 OFFICE O/P EST MOD 30 MIN: CPT

## 2021-03-16 PROCEDURE — 99072 ADDL SUPL MATRL&STAF TM PHE: CPT

## 2021-03-22 ENCOUNTER — APPOINTMENT (OUTPATIENT)
Dept: ORTHOPEDIC SURGERY | Facility: CLINIC | Age: 36
End: 2021-03-22
Payer: MEDICAID

## 2021-03-22 VITALS — BODY MASS INDEX: 32.14 KG/M2 | WEIGHT: 200 LBS | HEIGHT: 66 IN

## 2021-03-22 PROCEDURE — 99072 ADDL SUPL MATRL&STAF TM PHE: CPT

## 2021-03-22 PROCEDURE — 99215 OFFICE O/P EST HI 40 MIN: CPT

## 2021-03-22 PROCEDURE — 72100 X-RAY EXAM L-S SPINE 2/3 VWS: CPT

## 2021-03-31 ENCOUNTER — OUTPATIENT (OUTPATIENT)
Dept: OUTPATIENT SERVICES | Facility: HOSPITAL | Age: 36
LOS: 1 days | End: 2021-03-31
Payer: MEDICAID

## 2021-03-31 VITALS
DIASTOLIC BLOOD PRESSURE: 64 MMHG | OXYGEN SATURATION: 97 % | WEIGHT: 205.91 LBS | SYSTOLIC BLOOD PRESSURE: 124 MMHG | HEIGHT: 65 IN | TEMPERATURE: 98 F | HEART RATE: 91 BPM | RESPIRATION RATE: 14 BRPM

## 2021-03-31 DIAGNOSIS — M86.432: ICD-10-CM

## 2021-03-31 DIAGNOSIS — M86.60 OTHER CHRONIC OSTEOMYELITIS, UNSPECIFIED SITE: ICD-10-CM

## 2021-03-31 LAB
ALBUMIN SERPL ELPH-MCNC: 4.4 G/DL — SIGNIFICANT CHANGE UP (ref 3.3–5)
ALP SERPL-CCNC: 117 U/L — SIGNIFICANT CHANGE UP (ref 40–120)
ALT FLD-CCNC: 34 U/L — SIGNIFICANT CHANGE UP (ref 4–41)
ANION GAP SERPL CALC-SCNC: 9 MMOL/L — SIGNIFICANT CHANGE UP (ref 7–14)
AST SERPL-CCNC: 25 U/L — SIGNIFICANT CHANGE UP (ref 4–40)
BILIRUB SERPL-MCNC: 0.2 MG/DL — SIGNIFICANT CHANGE UP (ref 0.2–1.2)
BUN SERPL-MCNC: 15 MG/DL — SIGNIFICANT CHANGE UP (ref 7–23)
CALCIUM SERPL-MCNC: 9.2 MG/DL — SIGNIFICANT CHANGE UP (ref 8.4–10.5)
CHLORIDE SERPL-SCNC: 99 MMOL/L — SIGNIFICANT CHANGE UP (ref 98–107)
CO2 SERPL-SCNC: 29 MMOL/L — SIGNIFICANT CHANGE UP (ref 22–31)
CREAT SERPL-MCNC: 1.06 MG/DL — SIGNIFICANT CHANGE UP (ref 0.5–1.3)
GLUCOSE SERPL-MCNC: 108 MG/DL — HIGH (ref 70–99)
HCT VFR BLD CALC: 44.5 % — SIGNIFICANT CHANGE UP (ref 39–50)
HGB BLD-MCNC: 14.2 G/DL — SIGNIFICANT CHANGE UP (ref 13–17)
MCHC RBC-ENTMCNC: 25.9 PG — LOW (ref 27–34)
MCHC RBC-ENTMCNC: 31.9 GM/DL — LOW (ref 32–36)
MCV RBC AUTO: 81.2 FL — SIGNIFICANT CHANGE UP (ref 80–100)
NRBC # BLD: 0 /100 WBCS — SIGNIFICANT CHANGE UP
NRBC # FLD: 0 K/UL — SIGNIFICANT CHANGE UP
PLATELET # BLD AUTO: 250 K/UL — SIGNIFICANT CHANGE UP (ref 150–400)
POTASSIUM SERPL-MCNC: 3.7 MMOL/L — SIGNIFICANT CHANGE UP (ref 3.5–5.3)
POTASSIUM SERPL-SCNC: 3.7 MMOL/L — SIGNIFICANT CHANGE UP (ref 3.5–5.3)
PROT SERPL-MCNC: 7.6 G/DL — SIGNIFICANT CHANGE UP (ref 6–8.3)
RBC # BLD: 5.48 M/UL — SIGNIFICANT CHANGE UP (ref 4.2–5.8)
RBC # FLD: 13.2 % — SIGNIFICANT CHANGE UP (ref 10.3–14.5)
SODIUM SERPL-SCNC: 137 MMOL/L — SIGNIFICANT CHANGE UP (ref 135–145)
WBC # BLD: 8.83 K/UL — SIGNIFICANT CHANGE UP (ref 3.8–10.5)
WBC # FLD AUTO: 8.83 K/UL — SIGNIFICANT CHANGE UP (ref 3.8–10.5)

## 2021-03-31 PROCEDURE — 93010 ELECTROCARDIOGRAM REPORT: CPT

## 2021-03-31 RX ORDER — SODIUM CHLORIDE 9 MG/ML
1000 INJECTION, SOLUTION INTRAVENOUS
Refills: 0 | Status: DISCONTINUED | OUTPATIENT
Start: 2021-04-06 | End: 2021-04-20

## 2021-03-31 NOTE — H&P PST ADULT - HISTORY OF PRESENT ILLNESS
37y/o male scheduled for left forearm wound debridement and placement of external fixator possible integra and vac placement on 4/6/2021.  Pt states, "hx of heroin IV use, last use 3 yrs ago.  For the past 5 yrs has left forearm wound, the summer of 2020 bone became exposed, has been on abx since then."

## 2021-03-31 NOTE — H&P PST ADULT - MUSCULOSKELETAL COMMENTS
herniate disc in lower back, left forearm with wound bone exposed changes drsg daily with xeroform wearing split left arm drsg, wearing arm brace herniate disc in lower back, left forearm with wound bone exposed changes drsg daily with xeroform wearing brace

## 2021-03-31 NOTE — H&P PST ADULT - NSICDXPASTMEDICALHX_GEN_ALL_CORE_FT
PAST MEDICAL HISTORY:  Anemia     Chronic osteomyelitis of right ulna     History of heroin abuse last use 2018    Lumbar herniated disc

## 2021-03-31 NOTE — H&P PST ADULT - NSICDXPROBLEM_GEN_ALL_CORE_FT
PROBLEM DIAGNOSES  Problem: Chronic osteomyelitis  Assessment and Plan: Pt scheduled for left forearm wound debridement and placement of external fixator possible integra nd vac placement on 4/6/2021.  labs done results pending, ekg done.  Pt scheduled for covid testing.  Preop teaching done, pt able to verbalize understanding.   meds day of surgery-  clindamycin, bactrim

## 2021-04-02 DIAGNOSIS — Z01.818 ENCOUNTER FOR OTHER PREPROCEDURAL EXAMINATION: ICD-10-CM

## 2021-04-02 PROBLEM — M86.631: Chronic | Status: ACTIVE | Noted: 2021-03-31

## 2021-04-02 PROBLEM — M51.26 OTHER INTERVERTEBRAL DISC DISPLACEMENT, LUMBAR REGION: Chronic | Status: ACTIVE | Noted: 2021-03-31

## 2021-04-02 PROBLEM — F11.11 OPIOID ABUSE, IN REMISSION: Chronic | Status: ACTIVE | Noted: 2021-03-31

## 2021-04-03 ENCOUNTER — APPOINTMENT (OUTPATIENT)
Dept: DISASTER EMERGENCY | Facility: CLINIC | Age: 36
End: 2021-04-03

## 2021-04-04 LAB — SARS-COV-2 N GENE NPH QL NAA+PROBE: NOT DETECTED

## 2021-04-05 ENCOUNTER — TRANSCRIPTION ENCOUNTER (OUTPATIENT)
Age: 36
End: 2021-04-05

## 2021-04-05 NOTE — ASU PATIENT PROFILE, ADULT - PMH
Anemia    Chronic osteomyelitis of right ulna    History of heroin abuse  last use 2018  Lumbar herniated disc

## 2021-04-05 NOTE — ASU PATIENT PROFILE, ADULT - PAIN DESCRIPTION (FREQUENCY/QUALITY), PROFILE
"-- Message is from the [facility]--    Reason for Call: Patient would like a call from the [facility] office regarding information on her Endocrinologist.        Alternative phone number: [phone]    Turnaround time given to caller: ""This message will be sent to [facility] Provider's name]. The clinical team will fulfill your request as soon as they review your message when the office opens on Monday (or after the holiday). \""    "
She has osteoporosis and requests med change. Advised apt. She scheduled appt for Tuesday this week.
intermittent/radiating/sharp

## 2021-04-06 ENCOUNTER — OUTPATIENT (OUTPATIENT)
Dept: OUTPATIENT SERVICES | Facility: HOSPITAL | Age: 36
LOS: 1 days | Discharge: HOME | End: 2021-04-06
Payer: MEDICAID

## 2021-04-06 ENCOUNTER — APPOINTMENT (OUTPATIENT)
Dept: PLASTIC SURGERY | Facility: HOSPITAL | Age: 36
End: 2021-04-06
Payer: MEDICAID

## 2021-04-06 ENCOUNTER — APPOINTMENT (OUTPATIENT)
Dept: ORTHOPEDIC SURGERY | Facility: HOSPITAL | Age: 36
End: 2021-04-06

## 2021-04-06 VITALS
SYSTOLIC BLOOD PRESSURE: 139 MMHG | TEMPERATURE: 97 F | HEIGHT: 65 IN | WEIGHT: 205.91 LBS | RESPIRATION RATE: 16 BRPM | HEART RATE: 88 BPM | OXYGEN SATURATION: 93 % | DIASTOLIC BLOOD PRESSURE: 74 MMHG

## 2021-04-06 VITALS
HEART RATE: 73 BPM | OXYGEN SATURATION: 100 % | DIASTOLIC BLOOD PRESSURE: 73 MMHG | SYSTOLIC BLOOD PRESSURE: 119 MMHG | RESPIRATION RATE: 16 BRPM

## 2021-04-06 DIAGNOSIS — M86.432: ICD-10-CM

## 2021-04-06 PROCEDURE — 15273 SKIN SUB GRFT T/ARM/LG CHILD: CPT

## 2021-04-06 PROCEDURE — 15274 SKN SUB GRFT T/A/L CHILD ADD: CPT

## 2021-04-06 PROCEDURE — 97608 NEG PRS WND THER NDME>50SQCM: CPT | Mod: 59

## 2021-04-06 PROCEDURE — 11012 DEB SKIN BONE AT FX SITE: CPT

## 2021-04-06 PROCEDURE — 20690 APPL UNIPLN UNI EXT FIXJ SYS: CPT | Mod: LT

## 2021-04-06 PROCEDURE — 25575 OPTX RDL&ULN SHFT FX RDS&ULN: CPT | Mod: LT

## 2021-04-06 RX ORDER — OXYCODONE HYDROCHLORIDE 5 MG/1
5 TABLET ORAL ONCE
Refills: 0 | Status: DISCONTINUED | OUTPATIENT
Start: 2021-04-06 | End: 2021-04-06

## 2021-04-06 RX ORDER — ACETAMINOPHEN 500 MG
975 TABLET ORAL EVERY 6 HOURS
Refills: 0 | Status: DISCONTINUED | OUTPATIENT
Start: 2021-04-06 | End: 2021-04-20

## 2021-04-06 RX ORDER — OXYCODONE HYDROCHLORIDE 5 MG/1
1 TABLET ORAL
Qty: 30 | Refills: 0
Start: 2021-04-06 | End: 2021-04-10

## 2021-04-06 RX ORDER — AZTREONAM 2 G
1 VIAL (EA) INJECTION
Qty: 0 | Refills: 0 | DISCHARGE

## 2021-04-06 RX ORDER — ONDANSETRON 8 MG/1
4 TABLET, FILM COATED ORAL ONCE
Refills: 0 | Status: DISCONTINUED | OUTPATIENT
Start: 2021-04-06 | End: 2021-04-20

## 2021-04-06 RX ORDER — AZTREONAM 2 G
1 VIAL (EA) INJECTION
Qty: 84 | Refills: 0
Start: 2021-04-06 | End: 2021-05-17

## 2021-04-06 NOTE — ASU DISCHARGE PLAN (ADULT/PEDIATRIC) - CALL YOUR DOCTOR IF YOU HAVE ANY OF THE FOLLOWING:
Bleeding that does not stop/Pain not relieved by Medications/Fever greater than (need to indicate Fahrenheit or Celsius)/Wound/Surgical Site with redness, or foul smelling discharge or pus/Numbness, tingling, color or temperature change to extremity/Nausea and vomiting that does not stop/Unable to urinate/Inability to tolerate liquids or foods/Increased irritability or sluggishness

## 2021-04-06 NOTE — ASU DISCHARGE PLAN (ADULT/PEDIATRIC) - CARE PROVIDER_API CALL
Carmelo Kumar (MD)  ColonRectal Surgery; Plastic Surgery; Surgery; Surgery of the Hand  [address] [facility], [facility] [address]  [place] [address]  Phone: [phone]  Fax: [phone]  Follow Up Time: 1 week   Carmelo Kumar)  ColonRectal Surgery; Plastic Surgery; Surgery; Surgery of the Hand  [address] [facility], [address]  [place] [address]  Phone: [phone]  Fax: [phone]  Follow Up Time: 1 week    Corina Feng  Please go to the [facility] Ambulatory Care Unit located on C level of the main hospital for the Orthopaedic clinic.  Phone: [phone]  Fax: (   )    -  Scheduled Appointment: 04/14/2021 09:00 AM   Carmelo Kumar (MD)  ColonRectal Surgery; Plastic Surgery; Surgery; Surgery of the Hand  [address] [facility], [address]  [place] [address]  Phone: [phone]  Fax: [phone]  Follow Up Time: 1 week    Corina Feng  Please go to the [facility] Ambulatory Care Unit located on C level of the main hospital for the Orthopaedic clinic.  Phone: [phone]  Fax: (   )    -  Scheduled Appointment: 04/14/2021 09:00 AM    Corina Feng; MPH)  Orthopaedic Surgery  [address] [facility], [address]  [place] [address]  Phone: [phone]  Fax: [phone]  Follow Up Time:

## 2021-04-06 NOTE — ASU PREOP CHECKLIST - AICD PRESENT
PEDIATRIC GENERAL SURGERY CONSULT NOTE    Patient is a 13y old  Female who presents with a chief complaint of abdominal pain (21 Mar 2017 22:41)      HPI:  13 yo F who presents from OSH with 2 days of abdominal pain. Seen on 3/20 at [facility] and found to have complex R adnexal cyst on CT, and was discharged with GYN f/u. She saw Dr. Claudine Bradford Tuesday morning at 10:00, who sent her to [facility] for worsening pain and U/S showed same cyst and area concerning for hematosalpinx, free fluid. Hematocrit decreased from day prior from 42 to 36. Pt states that she still has RLQ, vomited once yesterday but her pain has overall significantly improved.    Menarche was 2 years ago. LMP . Periods are usually 4-6 weeks, she can be late but she never misses entirely. She changes tampons q3-4 hrs, although they're not often soaked through. No vaginal bleeding right now.     Concern for UTI, based on outside UA. Had 2 doses of antibiotic.       PRENATAL/BIRTH HISTORY:  [  ] Term   [ x ] Pre-term   Gest Age (wks):	32              Apgars:                    Birth Wt:  [  ] Spontaneous Vaginal Delivery	              [  ]     reason:    PAST MEDICAL & SURGICAL HISTORY:  Strabismus  Hemangioma  H/O excision of hemangioma  History of strabismus surgery      FAMILY HISTORY:  Family history of ovarian cancer (Grandparent)      SOCIAL HISTORY:    MEDICATIONS  (STANDING):  dextrose 5% + sodium chloride 0.9% with potassium chloride 20 mEq/L. - Pediatric 1000milliLiter(s) IV Continuous <Continuous>    MEDICATIONS  (PRN):  morphine  IV Intermittent - Peds 3milliGRAM(s) IV Intermittent every 4 hours PRN Severe Pain (7-10/10)  acetaminophen   Oral Tab/Cap - Peds. 650milliGRAM(s) Oral every 6 hours PRN Mild Pain (1 - 3)  ibuprofen  Oral Tab/Cap - Peds. 400milliGRAM(s) Oral every 6 hours PRN Moderate Pain (4 - 6)    Allergies    No Known Allergies    Intolerances        REVIEW OF SYSTEMS  All review of systems negative except for those marked.  Systemic:	[ ] Fever		[ ] Chills		[ ] Night sweats		[ ] Fatigue	[ ] Other  [] Cardiovascular:  [] Pulmonary:  [] Renal/Urologic:  [] Gastrointestinal:  [] Metabolic:  [] Neurologic:  [] Hematologic:  [] ENT:  [] Ophthalmologic:  [] Musculoskeletal:      Vital Signs Last 24 Hrs  T(C): 36.6, Max: 36.6 ( @ 21:03)  T(F): 97.8, Max: 97.8 ( @ 21:03)  HR: 87 (87 - 87)  BP: 114/53 (114/53 - 114/53)  BP(mean): --  RR: 20 (20 - 20)  SpO2: 100% (100% - 100%)  Daily Height/Length in cm: 152 (21 Mar 2017 21:03)    Daily     PHYSICAL EXAM:  General Appearance:	NAD    Psychological: appropriate affect, anxious  			  Head: WNL    Eyes: anicteric    ENT: MMM    Cardiovascular:	RRR  	  Pulmonary: normal resp pattern  		  Thorax:		  		  GI/Abdomen:	soft, nd, RLQ tenderness no rebound, voluntary gaurding  	  Skin:	WNL	  	  Musculoskeletal:	  			  LABORATORY VALUES                        11.1   9.49  )-----------( 283      ( 21 Mar 2017 21:00 )             32.6     21 Mar 2017 12:02    143    |  105    |  14     ----------------------------<  82     4.4     |  24     |  0.70     Ca    9.4        21 Mar 2017 12:02    TPro  7.4    /  Alb  4.1    /  TBili  0.7    /  DBili  x      /  AST  17     /  ALT  9      /  AlkPhos  106    21 Mar 2017 12:02    PT/INR - ( 20 Mar 2017 13:25 )   PT: 12.0 sec;   INR: 1.07 ratio         PTT - ( 20 Mar 2017 13:25 )  PTT:34.6 sec  Urinalysis Basic - ( 21 Mar 2017 13:54 )    Color: PL Yellow / Appearance: Clear / S.013 / pH: x  Gluc: x / Ketone: Small  / Bili: Negative / Urobili: Negative   Blood: x / Protein: Negative / Nitrite: Negative   Leuk Esterase: Negative / RBC: x / WBC x   Sq Epi: x / Non Sq Epi: Few /HPF / Bacteria: x        IMAGING STUDIES:      Assessment: 13F w R 5.7cm hemorrhagic ovarian cyst associated w RLQ abdominal pain    Plan:  - pain control  - serial abd exams  - no emergent surgical interventions  d/w peds sx fellow no

## 2021-04-06 NOTE — ASU DISCHARGE PLAN (ADULT/PEDIATRIC) - FOLLOW UP APPOINTMENTS
[facility], [facility] 140 may also call Recovery Room (PACU) 24/7 @ [phone]/[facility], Ambulatory Surgical Center

## 2021-04-06 NOTE — BRIEF OPERATIVE NOTE - NSICDXBRIEFPOSTOP_GEN_ALL_CORE_FT
POST-OP DIAGNOSIS:  Osteomyelitis of forearm 06-Apr-2021 12:19:33  Pool Sanchez  
POST-OP DIAGNOSIS:  Osteomyelitis of forearm 06-Apr-2021 12:19:33  Pool Sanchez

## 2021-04-06 NOTE — ASU DISCHARGE PLAN (ADULT/PEDIATRIC) - ASU DC SPECIAL INSTRUCTIONSFT
WOUND CARE:  Please keep pin sites clean and dry. Please do not Scrub or rub incisions. Do not use lotion or powder on incisions. You will be discharged with a wound vacuum. Please do not remove the wound vacuum. It will be evaluated in the office.  BATHING: You may shower and/or sponge bathe with a plastic bag covering the external fixation device. Do not get the external fixation device wet or soiled.  ACTIVITY: No heavy lifting or straining. Otherwise, you may return to your usual level of physical activity. If you are taking narcotic pain medication DO NOT drive a car, operate machinery or make important decisions.  DIET: Return to your usual diet.  NOTIFY YOUR SURGEON IF YOU HAVE: any bleeding that does not stop, any pus draining from your wound(s), any fever (over 100.4 F) persistent nausea/vomiting, or if your pain is not controlled on your discharge pain medications, unable to urinate.  Please follow up with your primary care physician in one week regarding your hospitalization, bring copies of your discharge paperwork.  Please follow up with your surgeon, Dr. Feng at [facility] on Wednesday, 4/13/21. An appointment has been made for you at 9:00am.  Please follow up with your surgeon, Dr. Kumar at his office in 1 week. Please follow up with Dr. Kumar within x1 week after discharge from the hospital. You may call [phone] to schedule an appointment. WOUND CARE:  You have an external fixator on your left arm. Please keep the pin sites clean by performing DAILY care as follows:    1. Mix hydrogen peroxide solution and saline in a 1:1 ratio (approximately a quarter cup total needed) to clean the bases of the six pins and surrounding skin  2. Wrap either xeroform gauze OR apply bacitracin ointment to the base of all 6 pin sites  3. Wrap all 6 pin sites with dry gauze.    Do not use lotion or powder on incisions.    You will also be discharged with a wound vacuum. Please do not remove the wound vacuum. It will be evaluated in the office. Please call Dr. Kumar's office at [phone] with any problems. If the wound vacuum loses suction and you cannot troubleshoot, please visit the [facility] ED as soon as possible for evaluation.    BATHING: You may shower and/or sponge bathe with a plastic bag covering the external fixation device. Do not get the external fixation device wet or soiled.  ACTIVITY: No weight bearing with the left upper extremity. No heavy lifting or straining. Otherwise, you may return to your usual level of physical activity. If you are taking narcotic pain medication DO NOT drive a car, operate machinery or make important decisions.  DIET: Return to your usual diet.  NOTIFY YOUR SURGEON IF YOU HAVE: any bleeding that does not stop, any pus draining from your wound(s), any fever (over 100.4 F) persistent nausea/vomiting, or if your pain is not controlled on your discharge pain medications, unable to urinate.    FOLLOW UP:  Please follow up with your surgeon, Dr. Feng at the [facility] Ambulatory Care Unit (C Level) on Wednesday, 4/14/21. An appointment has been made for you at 9:00am.  Please follow up with your surgeon, Dr. Kumar at his office in 1 week. You may call [phone] to schedule an appointment.

## 2021-04-06 NOTE — BRIEF OPERATIVE NOTE - NSICDXBRIEFPROCEDURE_GEN_ALL_CORE_FT
PROCEDURES:  Acellular dermal allograft of hand, first 100 sq cm or less 06-Apr-2021 12:19:12  Matthew Jolley  
PROCEDURES:  Debridement of skin, subcutaneous tissue, fascia, muscle, and bone of upper extremity 06-Apr-2021 12:18:30  Pool Sanchez  Application of left multiplanar external fixation system with pins or wires in more than one plane 06-Apr-2021 12:24:41  Pool Sanchez

## 2021-04-06 NOTE — BRIEF OPERATIVE NOTE - NSICDXBRIEFPREOP_GEN_ALL_CORE_FT
PRE-OP DIAGNOSIS:  Osteomyelitis of forearm 06-Apr-2021 12:19:02  Pool Sanchez  
PRE-OP DIAGNOSIS:  Osteomyelitis of forearm 06-Apr-2021 12:19:02  Pool Sanchez

## 2021-04-06 NOTE — BRIEF OPERATIVE NOTE - NSEVIDNCEINFORABSCESSFT_GEN_ALL_CORE
Chronic osteomyelitis Necrotic tissue, eschar, osteomyelitis Necrotic tissue, chronic eschar, osteomyelitis

## 2021-04-06 NOTE — BRIEF OPERATIVE NOTE - OPERATION/FINDINGS
integra over open wound of L dorsal forearm, vac placement
Extensive debridement of chronic left forearm wound was performed with evidence of osteomyelitis involving the midshaft radius and ulna. A Masquelet technique (first stage) was used including PMMA grafting of the resulting radius defect. PDS suture was used to secure a soft tissue flap over the PMMA segment. An integra skin graft was placed over the soft tissue defect with an overlying black sponge wound vacuum.    A tri-planar external fixation device was applied with pin sites including the left dorsal wrist, proximal ulna, and distal humerus.

## 2021-04-06 NOTE — ASU DISCHARGE PLAN (ADULT/PEDIATRIC) - NURSING INSTRUCTIONS
You received IV Tylenol for pain management at _10__. Please DO NOT take any Tylenol (Acetaminophen) containing products, such as Vicodin, Percocet, Excedrin, and cold medications for the next 6 hours (until __4_ PM). DO NOT TAKE MORE THAN 3000 MG OF TYLENOL in a 24 hour period.

## 2021-04-06 NOTE — ASU DISCHARGE PLAN (ADULT/PEDIATRIC) - PROVIDER TOKENS
PROVIDER:[TOKEN:[[id number]],FOLLOWUP:[1 week]] PROVIDER:[TOKEN:[[id number]],FOLLOWUP:[1 week]],FREE:[LAST:[Neina],FIRST:[Corina],PHONE:[phone]],FAX:[(   )    -],ADDRESS:[Please go to the [facility] Care Unit located on C level of the main hospital for the Orthopaedic clinic.],SCHEDULEDAPPT:[04/14/2021],SCHEDULEDAPPTTIME:[09:00 AM]] PROVIDER:[TOKEN:[[id number]],FOLLOWUP:[1 week]],FREE:[LAST:[Neina],FIRST:[Corina],PHONE:[phone]],FAX:[(   )    -],ADDRESS:[Please go to the [facility] Ambulatory Care Unit located on C level of the main hospital for the Orthopaedic clinic.],SCHEDULEDAPPT:[04/14/2021],SCHEDULEDAPPTTIME:[09:00 AM]],PROVIDER:[TOKEN:[[id number]]]

## 2021-04-14 ENCOUNTER — APPOINTMENT (OUTPATIENT)
Dept: ORTHOPEDIC SURGERY | Facility: HOSPITAL | Age: 36
End: 2021-04-14

## 2021-04-14 ENCOUNTER — OUTPATIENT (OUTPATIENT)
Dept: OUTPATIENT SERVICES | Facility: HOSPITAL | Age: 36
LOS: 1 days | End: 2021-04-14

## 2021-04-14 VITALS
TEMPERATURE: 97.9 F | SYSTOLIC BLOOD PRESSURE: 124 MMHG | BODY MASS INDEX: 32.95 KG/M2 | DIASTOLIC BLOOD PRESSURE: 61 MMHG | WEIGHT: 205 LBS | HEIGHT: 66 IN

## 2021-04-19 DIAGNOSIS — M86.432: ICD-10-CM

## 2021-04-19 DIAGNOSIS — S52.502N: ICD-10-CM

## 2021-04-20 ENCOUNTER — APPOINTMENT (OUTPATIENT)
Dept: ORTHOPEDIC SURGERY | Facility: CLINIC | Age: 36
End: 2021-04-20
Payer: MEDICAID

## 2021-04-22 ENCOUNTER — APPOINTMENT (OUTPATIENT)
Dept: PLASTIC SURGERY | Facility: CLINIC | Age: 36
End: 2021-04-22

## 2021-04-22 VITALS
TEMPERATURE: 98.4 F | BODY MASS INDEX: 32.95 KG/M2 | OXYGEN SATURATION: 98 % | DIASTOLIC BLOOD PRESSURE: 79 MMHG | WEIGHT: 205 LBS | HEIGHT: 66 IN | SYSTOLIC BLOOD PRESSURE: 155 MMHG | HEART RATE: 93 BPM

## 2021-04-22 NOTE — REASON FOR VISIT
[Post Op: _________] : a [unfilled] post op visit [FreeTextEntry1] : DOS 04/06/21 s/p Debridement of left forearm wound w/ Vac placement. pt is doing well.

## 2021-05-04 ENCOUNTER — APPOINTMENT (OUTPATIENT)
Dept: ORTHOPEDIC SURGERY | Facility: CLINIC | Age: 36
End: 2021-05-04
Payer: MEDICAID

## 2021-05-04 PROCEDURE — 73090 X-RAY EXAM OF FOREARM: CPT | Mod: LT

## 2021-05-04 PROCEDURE — 99024 POSTOP FOLLOW-UP VISIT: CPT

## 2021-05-04 NOTE — HISTORY OF PRESENT ILLNESS
[de-identified] : DOS: 04/06/2021\par Procedure: Left Forearm Wound Debridement and Placement of External Fixator and VAC Placement [de-identified] : Patient presents today for postop visit after a Left Forearm Wound Debridement and Placement of External Fixator and VAC Placement [de-identified] : Pin sites look clean. Tissue beneath the Integra is viable. There are some areas with fibrinous exudate. No evidence of infection. The soft tissue has not completely covered the cement.  Sutures removed today as no longer holding tension.  FInger swelling is improving.  [de-identified] : Xray with 3 views of the left arm done in office today, 05/04/2021, and reviewed by Dr. Corina Feng demonstrated a 3.75 cm intercalary cement spacer. Distal osteopenia at the distal radius. 6.6 cm defect in the ulna. Pin sites show no lucency's.  [de-identified] : Patient is 4 weeks s/p Left Forearm Wound Debridement and Placement of External Fixator and VAC Placement and is doing well overall. We discussed following up with Dr. Kumar before the next procedure scheduled on 5/25/2021. The Silicon layer was removed and the small number of sutures were removed. The remaining open area was covered with Xeroform, gauze and an ace bandage. He was given extra materials for dressing changes at home. Follow up after his next procedure.

## 2021-05-04 NOTE — ADDENDUM
[FreeTextEntry1] : I, Hali Duff acted solely as a scribe for Dr. Corina Feng on 05/04/2021. \par \par All medical record entries made by the Scribe were at my, Dr. Corina Feng, direction and personally dictated by me on 05/04/2021. I have personally reviewed the chart and agree that the record accurately reflects my personal performance of the history, physical exam, assessment and plan.

## 2021-05-12 ENCOUNTER — OUTPATIENT (OUTPATIENT)
Dept: OUTPATIENT SERVICES | Facility: HOSPITAL | Age: 36
LOS: 1 days | End: 2021-05-12

## 2021-05-12 VITALS
DIASTOLIC BLOOD PRESSURE: 80 MMHG | TEMPERATURE: 97 F | SYSTOLIC BLOOD PRESSURE: 110 MMHG | HEIGHT: 64 IN | OXYGEN SATURATION: 98 % | HEART RATE: 86 BPM | RESPIRATION RATE: 18 BRPM | WEIGHT: 203.93 LBS

## 2021-05-12 DIAGNOSIS — Z98.890 OTHER SPECIFIED POSTPROCEDURAL STATES: Chronic | ICD-10-CM

## 2021-05-12 DIAGNOSIS — I96 GANGRENE, NOT ELSEWHERE CLASSIFIED: ICD-10-CM

## 2021-05-12 LAB
BLD GP AB SCN SERPL QL: NEGATIVE — SIGNIFICANT CHANGE UP
RH IG SCN BLD-IMP: POSITIVE — SIGNIFICANT CHANGE UP

## 2021-05-12 RX ORDER — SODIUM CHLORIDE 9 MG/ML
3 INJECTION INTRAMUSCULAR; INTRAVENOUS; SUBCUTANEOUS EVERY 8 HOURS
Refills: 0 | Status: DISCONTINUED | OUTPATIENT
Start: 2021-05-25 | End: 2021-05-25

## 2021-05-12 RX ORDER — MELOXICAM 15 MG/1
1 TABLET ORAL
Qty: 0 | Refills: 0 | DISCHARGE

## 2021-05-12 RX ORDER — SODIUM CHLORIDE 9 MG/ML
1000 INJECTION, SOLUTION INTRAVENOUS
Refills: 0 | Status: DISCONTINUED | OUTPATIENT
Start: 2021-05-25 | End: 2021-05-25

## 2021-05-12 NOTE — H&P PST ADULT - NEGATIVE GASTROINTESTINAL SYMPTOMS
no nausea/no vomiting/no diarrhea/no constipation/no abdominal pain/no melena no nausea/no vomiting/no diarrhea/no constipation/no change in bowel habits/no abdominal pain/no melena/no jaundice

## 2021-05-12 NOTE — H&P PST ADULT - NEGATIVE GENERAL GENITOURINARY SYMPTOMS
no hematuria/no renal colic/no flank pain L/no flank pain R/no urine discoloration/no bladder infections/no dysuria/no urinary hesitancy

## 2021-05-12 NOTE — H&P PST ADULT - NEGATIVE CARDIOVASCULAR SYMPTOMS
no chest pain/no palpitations/no dyspnea on exertion/no orthopnea/no paroxysmal nocturnal dyspnea/no peripheral edema/no claudication no chest pain/no palpitations/no dyspnea on exertion/no orthopnea/no paroxysmal nocturnal dyspnea/no claudication

## 2021-05-12 NOTE — H&P PST ADULT - BP NONINVASIVE DIASTOLIC (MM HG)
CHIEF COMPLAINT: Fever     This is a 2 1/2 year old with no significant past medical history who presents with fever x 2 days, along with fatigue, and vasculitic symptoms include rash and peeling of the skin on hands and feets.   No abdominal pain /diarrhea/vomiting.    Patient was ill with shortness of breath and fever in March, but was unable to get Covid testing.     Labs are significant for mild leucocytosis (WBC 12.73 )with left shift and bandemia. CMP shows transaminitis.  D-dimer (694), fibrinogen(973) and ferritin(153) levels are elevated. CRP (96.4) and procalcitonin (2.39) levels are also elevated. Troponin levels are normal. BNP is 748. COVID PCR is pending.   Patient currently stable in the ER, seen by ID who recommend IVIG and aspirin therapy to treat for atypical KD vs MIS-C.     REVIEW OF SYSTEMS:  Constitutional - + irritability, + fever, no recent weight loss, no poor weight gain.  Eyes - no conjunctivitis, no discharge.  Ears / Nose / Mouth / Throat - no rhinorrhea, no congestion, no stridor.  Respiratory - no tachypnea, no increased work of breathing, no cough.  Cardiovascular -  no diaphoresis, no cyanosis  Gastrointestinal - no change in appetite, no vomiting, no diarrhea.  Genitourinary - no change in urination, no hematuria.  Integumentary - + rash, no jaundice, no pallor, no color change.  Hematologic / Lymphatic - no easy bruising, no bleeding, + lymphadenopathy.  Neurological - no seizures, no change in activity level, no developmental delay.  All Other Systems - reviewed, negative.    PAST MEDICAL HISTORY:  Medical Problems - The patient has no significant medical problems.  Allergies - No Known Allergies    PAST SURGICAL HISTORY:  The patient has had no prior surgeries.    MEDICATIONS:  aspirin  Oral Chewable Tab - Peds 162 milliGRAM(s) Chew once  immune globulin 10% IV Intermittent (GAMMAGARD) - Pediatric 25.9 Gram(s) IV Intermittent once    FAMILY HISTORY:  There is no history of congenital heart disease, arrhythmias, or sudden cardiac death in family members.    SOCIAL HISTORY:  The patient lives with mother and father.    PHYSICAL EXAMINATION:  Vital signs - Weight (kg): 12.95 (06-18 @ 12:16)  T(C): 36.6 (06-18-20 @ 17:13), Max: 37.8 (06-18-20 @ 12:16)  HR: 144 (06-18-20 @ 17:13) (129 - 170)  BP: 94/76 (06-18-20 @ 17:13) (93/60 - 109/78)    RR: 36 (06-18-20 @ 17:13) (26 - 36)  SpO2: 98% (06-18-20 @ 17:13) (98% - 100%)    General - non-dysmorphic appearance, well-developed, irritable  Skin - + rash , no desquamation, no cyanosis.  Eyes / ENT -  conjunctival injection bilaterally,   Pulmonary - normal inspiratory effort, no retractions, lungs clear to auscultation bilaterally, no wheezes, no rales.  Cardiovascular - normal rate, regular rhythm, normal S1 & S2, no murmurs, no rubs, no gallops, capillary refill < 2sec, normal pulses.  Gastrointestinal - soft, non-distended, non-tender, no hepatosplenomegaly   Musculoskeletal - no joint swelling, no clubbing, no edema.  Neurologic / Psychiatric - alert, oriented as age-appropriate, affect appropriate, moves all extremities, normal tone.    LABORATORY TESTS:                          12.1  CBC:   12.73 )-----------( 326   (06-18-20 @ 13:15)                          34.7               138   |  102   |  8                  Ca: 10.1   BMP:   ----------------------------< 79     Mg: x     (06-18-20 @ 13:15)             3.7    |  16    | 0.21               Ph: x        LFT:     TPro: 6.9 / Alb: 4.0 / TBili: 2.9 / DBili: 2.5 / AST: 224 / ALT: 632 / AlkPhos: 348   (06-18-20 @ 13:15)    COAG: PT: 16.8 / PTT: 36.5 / INR: 1.44   (06-18-20 @ 13:15)     CARDIAC MARKERS:             High-Sensitivity Troponin: < 6   (06-18-20 @ 13:15)             CK: x / CKMB: x   (06-18-20 @ 13:15)             Pro-BNP: 748.8   (06-18-20 @ 13:15)          IMAGING STUDIES:  Electrocardiogram - (6/18) normal sinus rhythm, normal QRS axis, normal intervals, no pre-excitation, no hypertrophy, no ST or T wave abnormalities.    Echocardiogram - Pending CHIEF COMPLAINT: Fever     This is a 2 1/2 year old with no significant past medical history who presents with fever x 2 days, along with fatigue, and vasculitic symptoms include rash and peeling of the skin on hands and feets.   No abdominal pain /diarrhea/vomiting.    Patient was ill with shortness of breath and fever in March, but was unable to get Covid testing.     Labs are significant for mild leucocytosis (WBC 12.73 )with left shift and bandemia. CMP shows transaminitis.  D-dimer (694), fibrinogen(973) and ferritin(153) levels are elevated. CRP (96.4) and procalcitonin (2.39) levels are also elevated. Troponin levels are normal. BNP is 748. COVID PCR is pending.   Patient currently stable in the ER, seen by ID who recommend IVIG and aspirin therapy to treat for atypical KD vs MIS-C.     REVIEW OF SYSTEMS:  Constitutional - + irritability, + fever, no recent weight loss, no poor weight gain.  Eyes - no conjunctivitis, no discharge.  Ears / Nose / Mouth / Throat - no rhinorrhea, no congestion, no stridor.  Respiratory - no tachypnea, no increased work of breathing, no cough.  Cardiovascular -  no diaphoresis, no cyanosis  Gastrointestinal - no change in appetite, no vomiting, no diarrhea.  Genitourinary - no change in urination, no hematuria.  Integumentary - + rash, no jaundice, no pallor, no color change.  Hematologic / Lymphatic - no easy bruising, no bleeding, + lymphadenopathy.  Neurological - no seizures, no change in activity level, no developmental delay.  All Other Systems - reviewed, negative.    PAST MEDICAL HISTORY:  Medical Problems - The patient has no significant medical problems.  Allergies - No Known Allergies    PAST SURGICAL HISTORY:  The patient has had no prior surgeries.    MEDICATIONS:  aspirin  Oral Chewable Tab - Peds 162 milliGRAM(s) Chew once  immune globulin 10% IV Intermittent (GAMMAGARD) - Pediatric 25.9 Gram(s) IV Intermittent once    FAMILY HISTORY:  There is no history of congenital heart disease, arrhythmias, or sudden cardiac death in family members.    SOCIAL HISTORY:  The patient lives with mother and father.    PHYSICAL EXAMINATION:  Vital signs - Weight (kg): 12.95 (06-18 @ 12:16)  T(C): 36.6 (06-18-20 @ 17:13), Max: 37.8 (06-18-20 @ 12:16)  HR: 144 (06-18-20 @ 17:13) (129 - 170)  BP: 94/76 (06-18-20 @ 17:13) (93/60 - 109/78)    RR: 36 (06-18-20 @ 17:13) (26 - 36)  SpO2: 98% (06-18-20 @ 17:13) (98% - 100%)    General - non-dysmorphic appearance, well-developed, irritable  Skin - + rash , no desquamation, no cyanosis.  Eyes / ENT -  conjunctival injection bilaterally,   Pulmonary - normal inspiratory effort, no retractions, lungs clear to auscultation bilaterally, no wheezes, no rales.  Cardiovascular - normal rate, regular rhythm, normal S1 & S2, no murmurs, no rubs, no gallops, capillary refill < 2sec, normal pulses.  Gastrointestinal - soft, non-distended, non-tender, no hepatosplenomegaly   Musculoskeletal - no joint swelling, no clubbing, no edema.  Neurologic / Psychiatric - alert, oriented as age-appropriate, affect appropriate, moves all extremities, normal tone.    LABORATORY TESTS:                          12.1  CBC:   12.73 )-----------( 326   (06-18-20 @ 13:15)                          34.7               138   |  102   |  8                  Ca: 10.1   BMP:   ----------------------------< 79     Mg: x     (06-18-20 @ 13:15)             3.7    |  16    | 0.21               Ph: x        LFT:     TPro: 6.9 / Alb: 4.0 / TBili: 2.9 / DBili: 2.5 / AST: 224 / ALT: 632 / AlkPhos: 348   (06-18-20 @ 13:15)    COAG: PT: 16.8 / PTT: 36.5 / INR: 1.44   (06-18-20 @ 13:15)     CARDIAC MARKERS:             High-Sensitivity Troponin: < 6   (06-18-20 @ 13:15)             CK: x / CKMB: x   (06-18-20 @ 13:15)             Pro-BNP: 748.8   (06-18-20 @ 13:15)          IMAGING STUDIES:  Electrocardiogram - (6/18) normal sinus rhythm, normal QRS axis, normal intervals, no pre-excitation, no hypertrophy, no ST or T wave abnormalities.      Echocardiogram, Pediatric (06.18.20)  Summary:   1. Focused study, suspicion for Kawasaki. A complete study is required in the future.   2. Trivial mitral valve regurgitation.   3. Mitral valve inflow Doppler is monophasic.   4. Trivial tricuspid valve regurgitation.   5. Normal left ventricular size, morphology and systolic function.   6. Normal right ventricular morphology with qualitatively normal size and systolic function.   7. Image quality of the coronary arteries is somewhat limited. The coronary arteries measure normally. There appears to be lack of tapering of RCA and LAD.   8. Normal systolic Doppler profile in the descending aorta at the level of the diaphragm and holodiastolic reversal of flow in the descending aorta.   9. No pericardial effusion.  10. Not assessed on this study: IVC, pulmonary veins, aortic arch, arch sidedness, branch pulmonary arteries. 80

## 2021-05-12 NOTE — H&P PST ADULT - HISTORY OF PRESENT ILLNESS
35 y/o male   scheduled for left forearm wound debridement and placement of external fixator possible integra and vac placement on 4/6/2021.  Pt states, "hx of heroin IV use, last use 3 yrs ago.  For the past 5 yrs has left forearm wound, the summer of 2020 bone became exposed, has been on abx since then." 35 y/o male with "hx of heroin IV use, last use 3 yrs ago.  For the past 5 yrs has left forearm wound, the summer of 2020 bone became exposed, has been on abx since then."  S/P left forearm wound debridement and placement of external fixator on 4/6/2021. Pt presents to PST for pre op evaluation in preparation for left radius Open Reduction Internal Fixation with Reamed aspirate bone grafting, reapplication of external fixator pedicled groin flap on 05/25/2021

## 2021-05-12 NOTE — H&P PST ADULT - NSICDXPASTSURGICALHX_GEN_ALL_CORE_FT
No significant past surgical history PAST SURGICAL HISTORY:  Status post debridement left arm, placement of external fixator 04/2021

## 2021-05-12 NOTE — H&P PST ADULT - NSICDXPROBLEM_GEN_ALL_CORE_FT
PROBLEM DIAGNOSES  Problem: Gangrene, not elsewhere classified  Assessment and Plan: Scheduled for left radius Open Reduction Internal Fixation with reamed aspirate bone grafting, reapplication of external fixator pedicled groin flap on 05/25/2021. Preop instructions, famotidine given and explained. Pt verbalized understanding.   Pt confirmed scheduled appt for Covid test pre op

## 2021-05-12 NOTE — H&P PST ADULT - MUSCULOSKELETAL
details… left arm/hand pitting edema, fingers warm to touch, + capillary refill/no joint warmth/joint swelling detailed exam

## 2021-05-20 ENCOUNTER — APPOINTMENT (OUTPATIENT)
Dept: PLASTIC SURGERY | Facility: CLINIC | Age: 36
End: 2021-05-20
Payer: MEDICAID

## 2021-05-20 PROCEDURE — 99024 POSTOP FOLLOW-UP VISIT: CPT

## 2021-05-22 ENCOUNTER — APPOINTMENT (OUTPATIENT)
Dept: DISASTER EMERGENCY | Facility: CLINIC | Age: 36
End: 2021-05-22

## 2021-05-23 LAB — SARS-COV-2 N GENE NPH QL NAA+PROBE: NOT DETECTED

## 2021-05-24 ENCOUNTER — TRANSCRIPTION ENCOUNTER (OUTPATIENT)
Age: 36
End: 2021-05-24

## 2021-05-25 ENCOUNTER — TRANSCRIPTION ENCOUNTER (OUTPATIENT)
Age: 36
End: 2021-05-25

## 2021-05-25 ENCOUNTER — INPATIENT (INPATIENT)
Facility: HOSPITAL | Age: 36
LOS: 0 days | Discharge: LEFT BEFORE DISCHARGE | End: 2021-05-25
Attending: ORTHOPAEDIC SURGERY | Admitting: ORTHOPAEDIC SURGERY
Payer: MEDICAID

## 2021-05-25 ENCOUNTER — APPOINTMENT (OUTPATIENT)
Dept: ORTHOPEDIC SURGERY | Facility: HOSPITAL | Age: 36
End: 2021-05-25

## 2021-05-25 ENCOUNTER — APPOINTMENT (OUTPATIENT)
Dept: PLASTIC SURGERY | Facility: HOSPITAL | Age: 36
End: 2021-05-25
Payer: MEDICAID

## 2021-05-25 VITALS
RESPIRATION RATE: 16 BRPM | HEART RATE: 80 BPM | OXYGEN SATURATION: 97 % | DIASTOLIC BLOOD PRESSURE: 70 MMHG | SYSTOLIC BLOOD PRESSURE: 135 MMHG

## 2021-05-25 VITALS
RESPIRATION RATE: 16 BRPM | WEIGHT: 199.96 LBS | OXYGEN SATURATION: 95 % | DIASTOLIC BLOOD PRESSURE: 74 MMHG | TEMPERATURE: 99 F | HEIGHT: 66 IN | SYSTOLIC BLOOD PRESSURE: 131 MMHG | HEART RATE: 81 BPM

## 2021-05-25 DIAGNOSIS — I96 GANGRENE, NOT ELSEWHERE CLASSIFIED: ICD-10-CM

## 2021-05-25 DIAGNOSIS — Z98.890 OTHER SPECIFIED POSTPROCEDURAL STATES: Chronic | ICD-10-CM

## 2021-05-25 PROCEDURE — 15734 MUSCLE-SKIN GRAFT TRUNK: CPT

## 2021-05-25 PROCEDURE — 73090 X-RAY EXAM OF FOREARM: CPT | Mod: 26,LT

## 2021-05-25 PROCEDURE — 11044 DBRDMT BONE 1ST 20 SQ CM/<: CPT | Mod: 78,LT

## 2021-05-25 PROCEDURE — 20690 APPL UNIPLN UNI EXT FIXJ SYS: CPT | Mod: 78,LT

## 2021-05-25 RX ORDER — KETOROLAC TROMETHAMINE 30 MG/ML
30 SYRINGE (ML) INJECTION EVERY 6 HOURS
Refills: 0 | Status: DISCONTINUED | OUTPATIENT
Start: 2021-05-25 | End: 2021-05-25

## 2021-05-25 RX ORDER — SODIUM CHLORIDE 9 MG/ML
1000 INJECTION, SOLUTION INTRAVENOUS
Refills: 0 | Status: DISCONTINUED | OUTPATIENT
Start: 2021-05-25 | End: 2021-05-25

## 2021-05-25 RX ORDER — ENOXAPARIN SODIUM 100 MG/ML
60 INJECTION SUBCUTANEOUS DAILY
Refills: 0 | Status: DISCONTINUED | OUTPATIENT
Start: 2021-05-25 | End: 2021-05-25

## 2021-05-25 RX ORDER — ACETAMINOPHEN 500 MG
3 TABLET ORAL
Qty: 0 | Refills: 0 | DISCHARGE
Start: 2021-05-25

## 2021-05-25 RX ORDER — CEFAZOLIN SODIUM 1 G
2000 VIAL (EA) INJECTION EVERY 8 HOURS
Refills: 0 | Status: DISCONTINUED | OUTPATIENT
Start: 2021-05-25 | End: 2021-05-25

## 2021-05-25 RX ORDER — MIDAZOLAM HYDROCHLORIDE 1 MG/ML
2 INJECTION, SOLUTION INTRAMUSCULAR; INTRAVENOUS ONCE
Refills: 0 | Status: DISCONTINUED | OUTPATIENT
Start: 2021-05-25 | End: 2021-05-25

## 2021-05-25 RX ORDER — METOCLOPRAMIDE HCL 10 MG
10 TABLET ORAL ONCE
Refills: 0 | Status: DISCONTINUED | OUTPATIENT
Start: 2021-05-25 | End: 2021-05-25

## 2021-05-25 RX ORDER — ACETAMINOPHEN 500 MG
975 TABLET ORAL EVERY 4 HOURS
Refills: 0 | Status: DISCONTINUED | OUTPATIENT
Start: 2021-05-25 | End: 2021-05-25

## 2021-05-25 RX ORDER — OXYCODONE AND ACETAMINOPHEN 5; 325 MG/1; MG/1
1 TABLET ORAL ONCE
Refills: 0 | Status: DISCONTINUED | OUTPATIENT
Start: 2021-05-25 | End: 2021-05-25

## 2021-05-25 RX ORDER — AZTREONAM 2 G
1 VIAL (EA) INJECTION
Qty: 0 | Refills: 0 | DISCHARGE

## 2021-05-25 RX ORDER — FENTANYL CITRATE 50 UG/ML
50 INJECTION INTRAVENOUS
Refills: 0 | Status: DISCONTINUED | OUTPATIENT
Start: 2021-05-25 | End: 2021-05-25

## 2021-05-25 RX ORDER — OXYCODONE HYDROCHLORIDE 5 MG/1
1 TABLET ORAL
Qty: 10 | Refills: 0
Start: 2021-05-25

## 2021-05-25 RX ORDER — AZTREONAM 2 G
1 VIAL (EA) INJECTION
Qty: 28 | Refills: 0
Start: 2021-05-25 | End: 2021-06-07

## 2021-05-25 RX ADMIN — FENTANYL CITRATE 50 MICROGRAM(S): 50 INJECTION INTRAVENOUS at 14:45

## 2021-05-25 RX ADMIN — FENTANYL CITRATE 50 MICROGRAM(S): 50 INJECTION INTRAVENOUS at 14:35

## 2021-05-25 RX ADMIN — FENTANYL CITRATE 50 MICROGRAM(S): 50 INJECTION INTRAVENOUS at 15:00

## 2021-05-25 RX ADMIN — SODIUM CHLORIDE 3 MILLILITER(S): 9 INJECTION INTRAMUSCULAR; INTRAVENOUS; SUBCUTANEOUS at 14:51

## 2021-05-25 NOTE — DISCHARGE NOTE PROVIDER - CARE PROVIDERS DIRECT ADDRESSES
[email or web address]

## 2021-05-25 NOTE — BRIEF OPERATIVE NOTE - OPERATION/FINDINGS
I&D of left forearm wound, antibiotic spacer placement and re-application of external fixator; groin flap performed by IZZY
Left pedicled groin flap elevation and application to left dorsal forearm

## 2021-05-25 NOTE — CHART NOTE - NSCHARTNOTEFT_GEN_A_CORE
The patient expressed at strong desire to go home from the PACU. Plastic and orthopaedic surgery teams requested that the patient stay overnight for observation of the flap and coordination of visiting nursing services. Patient was advised of risks of leaving against medical advice including issues with vascular supply to the flap compromising viability, inadequate pain control, post-anesthetic complications such as nausea, and issues with wound care without the aid of VNS. Patient expressed understanding of these risks in conversations with the residents and attendings from both plastic and orthopaedic surgery and continued to express desire to leave AMA. Patient was sent home with wound supplies to last until follow up with Dr. Kumar later this week.     Kellen Baker PGY-1  Plastic and Reconstructive Surgery

## 2021-05-25 NOTE — DISCHARGE NOTE PROVIDER - PROVIDER TOKENS
PROVIDER:[TOKEN:[[id number]],FOLLOWUP:[1 week]],PROVIDER:[TOKEN:[[id number]],FOLLOWUP:[2 weeks]] PROVIDER:[TOKEN:[[id number]],FOLLOWUP:[1-3 days]],PROVIDER:[TOKEN:[[id number]],FOLLOWUP:[2 weeks]]

## 2021-05-25 NOTE — DISCHARGE NOTE PROVIDER - CARE PROVIDER_API CALL
Carmelo Kumar (MD)  ColonRectal Surgery; Plastic Surgery; Surgery; Surgery of the Hand  [address], [address]  [place] [address]  Phone: [phone]  Fax: [phone]  Follow Up Time: 1 week    Corina Feng; MPH)  Orthopaedic Surgery  [address]  [place] [address]  Phone: [phone]  Fax: [phone]  Follow Up Time: 2 weeks   Carmelo Kumar (MD)  ColonRectal Surgery; Plastic Surgery; Surgery; Surgery of the Hand  [address], [address]  [place] [address]  Phone: [phone]  Fax: [phone]  Follow Up Time: 1-3 days    Corina Feng; MPH)  Orthopaedic Surgery  [address]  [place] [address]  Phone: [phone]  Fax: [phone]  Follow Up Time: 2 weeks

## 2021-05-25 NOTE — DISCHARGE NOTE PROVIDER - NSDCQMAMI_CARD_ALL_CORE
No
Follow up with Dr. Leonard on 08/31 for repeat beta check. Drink plenty of fluids. Return to ED immediately if you have any worsening pain or bleeding.

## 2021-05-25 NOTE — DISCHARGE NOTE PROVIDER - NSDCFUADDINST_GEN_ALL_CORE_FT
FOLLOW UP: Please follow up with your plastic surgeon in 1 week and orthopedic surgeon in 2 weeks, call to make an appt.  DIET: You may resume your regular diet. Narcotic pain medicine can cause extreme nausea and constipation. Drink plenty of water and take diuretics (colace, Miralax) as needed. You can get them from your local pharmacy.  WOUND CARE:  Please keep incisions clean and dry. Please do not scrub or rub incisions. Do not use lotion or powder on incisions. Xeroform to both the skin and fat sides of the flap should be changed daily. abd pads to open groin incision should be changed daily, sooner if saturated/soiled and secured with paper/silk tape. Remainder of incisions open to air. Continue pin site care as before.  BATHING: Keep your surgical area clean and dry. You may use sponge bath to bathe.  Do not submerge wound underwater.   PAIN CONTROL:  Alternate between taking Ibuprofen and Tylenol so you are taking pain medication every 3 to 4 hours.   It is important to elevate your arm to keep swelling down and the pain manageable.   MEDICATIONS: Take all medications as prescribed.   ACTIVITY: Your arm should be propped up at all times to take tension off your groin flap. You cannot lie flat. You will need to sleep in a recliner or propped up on pillows. If you are taking narcotic pain medication (such as vicodin, oxycodone, or Percocet) DO NOT drive a car, operate machinery or make important decisions.  NOTIFY YOUR SURGEON IF: You have any bleeding that does not stop, any pus draining from your wound(s), any fever (over 100.4 F) or chills, persistent nausea/vomiting, persistent diarrhea, or if your pain is not controlled on your discharge pain medications.   FOLLOW UP: Please follow up with your plastic surgeon in 1-3 days and orthopedic surgeon in 2 weeks, call to make an appt.  DIET: You may resume your regular diet. Narcotic pain medicine can cause extreme nausea and constipation. Drink plenty of water and take diuretics (colace, Miralax) as needed. You can get them from your local pharmacy.  FLAP: Please check the skin transferred from your groin to your forearm several times per day. If the skin becomes pale or dark, looks bruised, or becomes cold please call Dr. Kumar's office IMMEDIATELY.  WOUND CARE:  Please keep incisions clean and dry. Please do not scrub or rub incisions. Do not use lotion or powder on incisions. Xeroform to both the skin and fat sides of the flap should be changed daily. abd pads to open groin incision should be changed daily, sooner if saturated/soiled and secured with paper/silk tape. Remainder of incisions open to air. Continue pin site care as before.  BATHING: Keep your surgical area clean and dry. You may use sponge bath to bathe.  Do not submerge wound underwater.   PAIN CONTROL:  Alternate between taking Ibuprofen and Tylenol so you are taking pain medication every 3 to 4 hours.   It is important to elevate your arm to keep swelling down and the pain manageable.   MEDICATIONS: Take all medications as prescribed.   ACTIVITY: Your arm should be propped up at all times to take tension off your groin flap. You cannot lie flat. You will need to sleep in a recliner or propped up on pillows. If you are taking narcotic pain medication (such as vicodin, oxycodone, or Percocet) DO NOT drive a car, operate machinery or make important decisions.  NOTIFY YOUR SURGEON IF: You have any bleeding that does not stop, any pus draining from your wound(s), any fever (over 100.4 F) or chills, persistent nausea/vomiting, persistent diarrhea, or if your pain is not controlled on your discharge pain medications.

## 2021-05-25 NOTE — DISCHARGE NOTE NURSING/CASE MANAGEMENT/SOCIAL WORK - NSSCNAMETXT_GEN_ALL_CORE
[facility]  RN will call to set up appointment within 24 hours of d/c for home visit on 5/27/2021 [facility]  RN will call to set up appointment within 24 hours of d/c for home visit on 5/26/2021

## 2021-05-25 NOTE — DISCHARGE NOTE PROVIDER - NSDCCPCAREPLAN_GEN_ALL_CORE_FT
PRINCIPAL DISCHARGE DIAGNOSIS  Diagnosis: Chronic osteomyelitis  Assessment and Plan of Treatment:

## 2021-05-25 NOTE — DISCHARGE NOTE PROVIDER - NSDCMRMEDTOKEN_GEN_ALL_CORE_FT
Bactrim  mg-160 mg oral tablet: 1 tab(s) orally every 12 hours   acetaminophen 325 mg oral tablet: 3 tab(s) orally every 4 hours  Bactrim  mg-160 mg oral tablet: 1 tab(s) orally every 12 hours  ibuprofen 200 mg oral tablet: 4 tab(s) orally every 8 hours, As Needed  oxyCODONE 5 mg oral tablet: 1 tab(s) orally every 6 hours, As Needed MDD:4

## 2021-05-25 NOTE — DISCHARGE NOTE PROVIDER - HOSPITAL COURSE
Patient presented for scheduled pedicled groin flap and ex fix replacement. He tolerated the procedure without issue and was sent to pacu in stable condition.  The patient was admitted to the orthopedic service and sent to the surgical floor.  Pain was controlled with PO pain medications, and they were started on chemical and mechanical DVT prophylaxis.  They were seen by physical therapy and were recommended for discharge to _.  On the day of discharge, the patient was ambulating, voiding without difficulty, and tolerating a regular diet.  Pain was controlled on oral medications. Patient was discharged with oral antibiotics. All questions were answered and the patient was in agreement with the discharge plan.    Patient was advised to follow-up with their surgeon in 2 weeks and call the office with any questions or concerns.   Patient presented for scheduled pedicled groin flap and ex fix replacement. He tolerated the procedure without issue and was sent to pacu in stable condition. The patient expressed at strong desire to go home from the PACU. Plastic and orthopaedic surgery teams requested that the patient stay overnight for observation of the flap and coordination of visiting nursing services. Patient was advised of risks of leaving against medical advice including issues with vascular supply to the flap compromising viability, inadequate pain control, post-anesthetic complications such as nausea, and issues with wound care without the aid of VNS. Patient expressed understanding of these risks in conversations with the residents and attendings from both plastic and orthopaedic surgery and continued to express desire to leave AMA. Patient was sent home with wound supplies to last until follow up with Dr. Kumar later this week.     Pain was controlled on oral medications. Patient was discharged with oral antibiotics. All questions were answered and the patient was in agreement with the discharge plan. Patient was advised to follow-up with their plastic surgeon in 1-3 days and orthopaedic surgeon in 2 weeks and call the office with any questions or concerns.

## 2021-05-25 NOTE — DISCHARGE NOTE NURSING/CASE MANAGEMENT/SOCIAL WORK - PATIENT PORTAL LINK FT
You can access the FollowMyHealth Patient Portal offered by [facility] by registering at the following website: [email or web address]. By joining [facility]’s FollowMyHealth portal, you will also be able to view your health information using other applications (apps) compatible with our system.

## 2021-05-25 NOTE — BRIEF OPERATIVE NOTE - NSICDXBRIEFPREOP_GEN_ALL_CORE_FT
PRE-OP DIAGNOSIS:  Chronic osteomyelitis, forearm, left 25-May-2021 14:31:26  Karthik Pearl  Open wound of left forearm, subsequent encounter 25-May-2021 14:32:17  Karthik Pearl  
PRE-OP DIAGNOSIS:  Open wound of left forearm, subsequent encounter 25-May-2021 14:32:17  Karthik Pearl

## 2021-05-25 NOTE — PROVIDER CONTACT NOTE (OTHER) - ASSESSMENT
Met with pt, he agreed to home care and choose [facility] for woundcare.  SOC will be tomorrow 5/26/21. Pt was provided with initial supplies prior to d/c.  Plastics Dr. Kellen Baker went over discharge and wound/flap care instructions with patient.  Pt verbalized understanding.  Pt will be follow up in the community with Dr Kumar.

## 2021-05-25 NOTE — BRIEF OPERATIVE NOTE - NSICDXBRIEFPROCEDURE_GEN_ALL_CORE_FT
PROCEDURES:  Incision and drainage with debridement of bone of forearm 25-May-2021 14:29:49  Karthik Pearl  Placement, antibiotic spacer 25-May-2021 14:31:08  Karthik Pearl  
PROCEDURES:  Fasciocutaneous flap of right groin 25-May-2021 14:44:32  Danyell Singer

## 2021-05-27 ENCOUNTER — APPOINTMENT (OUTPATIENT)
Dept: PLASTIC SURGERY | Facility: CLINIC | Age: 36
End: 2021-05-27
Payer: MEDICAID

## 2021-05-27 VITALS — TEMPERATURE: 97.2 F | WEIGHT: 205 LBS | HEIGHT: 66 IN | BODY MASS INDEX: 32.95 KG/M2

## 2021-05-27 PROCEDURE — 99024 POSTOP FOLLOW-UP VISIT: CPT

## 2021-06-01 ENCOUNTER — APPOINTMENT (OUTPATIENT)
Dept: PLASTIC SURGERY | Facility: CLINIC | Age: 36
End: 2021-06-01

## 2021-06-01 VITALS — WEIGHT: 205 LBS | BODY MASS INDEX: 32.95 KG/M2 | HEIGHT: 66 IN | TEMPERATURE: 97.2 F

## 2021-06-01 RX ORDER — SILVER SULFADIAZINE 10 G/1000G
1 CREAM TOPICAL DAILY
Qty: 1 | Refills: 1 | Status: ACTIVE | COMMUNITY
Start: 2021-06-01 | End: 1900-01-01

## 2021-06-01 RX ORDER — OXYCODONE 5 MG/1
5 TABLET ORAL
Qty: 10 | Refills: 0 | Status: ACTIVE | COMMUNITY
Start: 2021-05-25

## 2021-06-01 NOTE — HISTORY OF PRESENT ILLNESS
[FreeTextEntry1] : 36 year old male who presents for a post op visit s/p External fixation of L forearm, placement of pins, Left groin pedicled flap reconstruction DOS: 05/25/21

## 2021-06-03 ENCOUNTER — APPOINTMENT (OUTPATIENT)
Dept: PLASTIC SURGERY | Facility: CLINIC | Age: 36
End: 2021-06-03

## 2021-06-03 VITALS — TEMPERATURE: 97.8 F | BODY MASS INDEX: 32.95 KG/M2 | WEIGHT: 205 LBS | HEIGHT: 66 IN

## 2021-06-08 ENCOUNTER — APPOINTMENT (OUTPATIENT)
Dept: ORTHOPEDIC SURGERY | Facility: CLINIC | Age: 36
End: 2021-06-08

## 2021-06-08 ENCOUNTER — OUTPATIENT (OUTPATIENT)
Dept: OUTPATIENT SERVICES | Facility: HOSPITAL | Age: 36
LOS: 1 days | End: 2021-06-08

## 2021-06-08 VITALS
TEMPERATURE: 97 F | DIASTOLIC BLOOD PRESSURE: 73 MMHG | HEART RATE: 96 BPM | WEIGHT: 201.94 LBS | SYSTOLIC BLOOD PRESSURE: 136 MMHG | OXYGEN SATURATION: 97 % | RESPIRATION RATE: 16 BRPM | HEIGHT: 65 IN

## 2021-06-08 DIAGNOSIS — I96 GANGRENE, NOT ELSEWHERE CLASSIFIED: ICD-10-CM

## 2021-06-08 DIAGNOSIS — S52.92XA UNSPECIFIED FRACTURE OF LEFT FOREARM, INITIAL ENCOUNTER FOR CLOSED FRACTURE: ICD-10-CM

## 2021-06-08 DIAGNOSIS — Z98.890 OTHER SPECIFIED POSTPROCEDURAL STATES: Chronic | ICD-10-CM

## 2021-06-08 LAB
ANION GAP SERPL CALC-SCNC: 13 MMOL/L — SIGNIFICANT CHANGE UP (ref 7–14)
BLD GP AB SCN SERPL QL: NEGATIVE — SIGNIFICANT CHANGE UP
BUN SERPL-MCNC: 15 MG/DL — SIGNIFICANT CHANGE UP (ref 7–23)
CALCIUM SERPL-MCNC: 9.1 MG/DL — SIGNIFICANT CHANGE UP (ref 8.4–10.5)
CHLORIDE SERPL-SCNC: 101 MMOL/L — SIGNIFICANT CHANGE UP (ref 98–107)
CO2 SERPL-SCNC: 23 MMOL/L — SIGNIFICANT CHANGE UP (ref 22–31)
CREAT SERPL-MCNC: 1.05 MG/DL — SIGNIFICANT CHANGE UP (ref 0.5–1.3)
GLUCOSE SERPL-MCNC: 84 MG/DL — SIGNIFICANT CHANGE UP (ref 70–99)
HCT VFR BLD CALC: 40.2 % — SIGNIFICANT CHANGE UP (ref 39–50)
HGB BLD-MCNC: 12.3 G/DL — LOW (ref 13–17)
MCHC RBC-ENTMCNC: 25 PG — LOW (ref 27–34)
MCHC RBC-ENTMCNC: 30.6 GM/DL — LOW (ref 32–36)
MCV RBC AUTO: 81.7 FL — SIGNIFICANT CHANGE UP (ref 80–100)
NRBC # BLD: 0 /100 WBCS — SIGNIFICANT CHANGE UP
NRBC # FLD: 0 K/UL — SIGNIFICANT CHANGE UP
PLATELET # BLD AUTO: 329 K/UL — SIGNIFICANT CHANGE UP (ref 150–400)
POTASSIUM SERPL-MCNC: 3.8 MMOL/L — SIGNIFICANT CHANGE UP (ref 3.5–5.3)
POTASSIUM SERPL-SCNC: 3.8 MMOL/L — SIGNIFICANT CHANGE UP (ref 3.5–5.3)
RBC # BLD: 4.92 M/UL — SIGNIFICANT CHANGE UP (ref 4.2–5.8)
RBC # FLD: 13.5 % — SIGNIFICANT CHANGE UP (ref 10.3–14.5)
RH IG SCN BLD-IMP: POSITIVE — SIGNIFICANT CHANGE UP
SODIUM SERPL-SCNC: 137 MMOL/L — SIGNIFICANT CHANGE UP (ref 135–145)
WBC # BLD: 10.16 K/UL — SIGNIFICANT CHANGE UP (ref 3.8–10.5)
WBC # FLD AUTO: 10.16 K/UL — SIGNIFICANT CHANGE UP (ref 3.8–10.5)

## 2021-06-08 RX ORDER — SODIUM CHLORIDE 9 MG/ML
1000 INJECTION, SOLUTION INTRAVENOUS
Refills: 0 | Status: DISCONTINUED | OUTPATIENT
Start: 2021-06-16 | End: 2021-06-30

## 2021-06-08 RX ORDER — IBUPROFEN 200 MG
4 TABLET ORAL
Qty: 0 | Refills: 0 | DISCHARGE

## 2021-06-08 NOTE — H&P PST ADULT - NEGATIVE CARDIOVASCULAR SYMPTOMS
no chest pain/no palpitations/no dyspnea on exertion/no orthopnea/no paroxysmal nocturnal dyspnea/no claudication

## 2021-06-08 NOTE — H&P PST ADULT - RS GEN PE MLT RESP DETAILS PC
respirations non-labored/clear to auscultation bilaterally/no chest wall tenderness/no intercostal retractions/no rales/no rhonchi

## 2021-06-08 NOTE — H&P PST ADULT - NSICDXPASTSURGICALHX_GEN_ALL_CORE_FT
PAST SURGICAL HISTORY:  Status post debridement left arm, placement of external fixator 04/2021     PAST SURGICAL HISTORY:  Status post debridement left arm, placement of external fixator 04/2021   I&D left forearm wound, antibiotic spacer placement re-application of external fixator, groin flap 5/25/21

## 2021-06-08 NOTE — H&P PST ADULT - HISTORY OF PRESENT ILLNESS
35 y/o male with "hx of heroin IV use, last use 3 yrs ago.  For the past 5 yrs has left forearm wound, the summer of 2020 bone became exposed, has been on abx since then."  S/P left forearm wound debridement and placement of external fixator on 4/6/2021 and I&D left forearm wound, antibiotic spacer placement re-application of external fixator, groin flap 5/25/2021.  Pt is scheduled for left groin flat division and closure, LT radius ORIF with iliac crest bone graft.

## 2021-06-08 NOTE — H&P PST ADULT - MUSCULOSKELETAL
left arm/hand pitting edema, fingers warm to touch, + capillary refill/no joint warmth/joint swelling details… detailed exam

## 2021-06-08 NOTE — H&P PST ADULT - NEGATIVE GASTROINTESTINAL SYMPTOMS
no nausea/no vomiting/no diarrhea/no constipation/no change in bowel habits/no abdominal pain/no jaundice

## 2021-06-08 NOTE — H&P PST ADULT - NSICDXPROBLEM_GEN_ALL_CORE_FT
PROBLEM DIAGNOSES  Problem: Left radial fracture  Assessment and Plan: Pt is scheduled for left groin flat division and closure, LT radius ORIF with iliac crest bone graft on 6/16/21.  Verbal and written pre op instructions reviewed with patient and pt able to verbalize understanding.  Pt instructed to follow surgeon's guidelines regarding COVID testing preop.   Pt instructed to take bactrim AM of surgery with a sip of water, pt able to verbalize understanding.

## 2021-06-08 NOTE — H&P PST ADULT - NSICDXPASTMEDICALHX_GEN_ALL_CORE_FT
PAST MEDICAL HISTORY:  Anemia     Chronic osteomyelitis of right ulna     Fracture of radius     History of heroin abuse last use 2018    Lumbar herniated disc

## 2021-06-08 NOTE — H&P PST ADULT - NEGATIVE GENERAL GENITOURINARY SYMPTOMS
no hematuria/no flank pain L/no flank pain R/no urine discoloration/no bladder infections/no incontinence/no dysuria/no urinary hesitancy

## 2021-06-10 ENCOUNTER — APPOINTMENT (OUTPATIENT)
Dept: PLASTIC SURGERY | Facility: CLINIC | Age: 36
End: 2021-06-10
Payer: MEDICAID

## 2021-06-10 VITALS — TEMPERATURE: 97.6 F | HEIGHT: 66 IN | BODY MASS INDEX: 32.95 KG/M2 | WEIGHT: 205 LBS

## 2021-06-10 PROCEDURE — 99024 POSTOP FOLLOW-UP VISIT: CPT

## 2021-06-10 NOTE — REASON FOR VISIT
[Post Op: _________] : a [unfilled] post op visit [FreeTextEntry1] :  s/p External fixation of L forearm, placement of pins, Left groin pedicled flap reconstruction DOS: 05/25/21 \par

## 2021-06-13 ENCOUNTER — APPOINTMENT (OUTPATIENT)
Dept: DISASTER EMERGENCY | Facility: CLINIC | Age: 36
End: 2021-06-13

## 2021-06-13 LAB — SARS-COV-2 N GENE NPH QL NAA+PROBE: NOT DETECTED

## 2021-06-14 PROBLEM — S52.90XA UNSPECIFIED FRACTURE OF UNSPECIFIED FOREARM, INITIAL ENCOUNTER FOR CLOSED FRACTURE: Chronic | Status: ACTIVE | Noted: 2021-06-08

## 2021-06-15 ENCOUNTER — TRANSCRIPTION ENCOUNTER (OUTPATIENT)
Age: 36
End: 2021-06-15

## 2021-06-16 ENCOUNTER — APPOINTMENT (OUTPATIENT)
Dept: PLASTIC SURGERY | Facility: HOSPITAL | Age: 36
End: 2021-06-16
Payer: MEDICAID

## 2021-06-16 ENCOUNTER — OUTPATIENT (OUTPATIENT)
Dept: OUTPATIENT SERVICES | Facility: HOSPITAL | Age: 36
LOS: 1 days | Discharge: HOME | End: 2021-06-16
Payer: MEDICAID

## 2021-06-16 ENCOUNTER — APPOINTMENT (OUTPATIENT)
Dept: ORTHOPEDIC SURGERY | Facility: HOSPITAL | Age: 36
End: 2021-06-16

## 2021-06-16 VITALS
WEIGHT: 201.94 LBS | OXYGEN SATURATION: 95 % | RESPIRATION RATE: 16 BRPM | TEMPERATURE: 98 F | SYSTOLIC BLOOD PRESSURE: 147 MMHG | HEART RATE: 86 BPM | HEIGHT: 66 IN | DIASTOLIC BLOOD PRESSURE: 71 MMHG

## 2021-06-16 VITALS
TEMPERATURE: 98 F | SYSTOLIC BLOOD PRESSURE: 159 MMHG | OXYGEN SATURATION: 100 % | HEART RATE: 75 BPM | DIASTOLIC BLOOD PRESSURE: 88 MMHG | RESPIRATION RATE: 16 BRPM

## 2021-06-16 DIAGNOSIS — I96 GANGRENE, NOT ELSEWHERE CLASSIFIED: ICD-10-CM

## 2021-06-16 DIAGNOSIS — Z98.890 OTHER SPECIFIED POSTPROCEDURAL STATES: Chronic | ICD-10-CM

## 2021-06-16 PROCEDURE — 25574 OPTX RDL&ULN SHFT FX RDS/ULN: CPT | Mod: 58,LT

## 2021-06-16 PROCEDURE — 15600 DELAY FLAP TRUNK: CPT | Mod: 58

## 2021-06-16 PROCEDURE — 14301 TIS TRNFR ANY 30.1-60 SQ CM: CPT | Mod: 58

## 2021-06-16 PROCEDURE — 20982 ABLATE BONE TUMOR(S) PERQ: CPT | Mod: 58,LT

## 2021-06-16 PROCEDURE — 15002 WOUND PREP TRK/ARM/LEG: CPT | Mod: 58

## 2021-06-16 PROCEDURE — 20694 RMVL EXT FIXJ SYS UNDER ANES: CPT | Mod: 58,LT

## 2021-06-16 RX ORDER — FENTANYL CITRATE 50 UG/ML
50 INJECTION INTRAVENOUS
Refills: 0 | Status: DISCONTINUED | OUTPATIENT
Start: 2021-06-16 | End: 2021-06-16

## 2021-06-16 RX ORDER — HYDROMORPHONE HYDROCHLORIDE 2 MG/ML
1 INJECTION INTRAMUSCULAR; INTRAVENOUS; SUBCUTANEOUS
Refills: 0 | Status: DISCONTINUED | OUTPATIENT
Start: 2021-06-16 | End: 2021-06-16

## 2021-06-16 RX ORDER — ONDANSETRON 8 MG/1
4 TABLET, FILM COATED ORAL ONCE
Refills: 0 | Status: DISCONTINUED | OUTPATIENT
Start: 2021-06-16 | End: 2021-06-30

## 2021-06-16 RX ORDER — KETOROLAC TROMETHAMINE 30 MG/ML
30 SYRINGE (ML) INJECTION ONCE
Refills: 0 | Status: DISCONTINUED | OUTPATIENT
Start: 2021-06-16 | End: 2021-06-16

## 2021-06-16 RX ADMIN — Medication 30 MILLIGRAM(S): at 18:00

## 2021-06-16 RX ADMIN — HYDROMORPHONE HYDROCHLORIDE 1 MILLIGRAM(S): 2 INJECTION INTRAMUSCULAR; INTRAVENOUS; SUBCUTANEOUS at 18:15

## 2021-06-16 RX ADMIN — Medication 30 MILLIGRAM(S): at 17:49

## 2021-06-16 RX ADMIN — HYDROMORPHONE HYDROCHLORIDE 1 MILLIGRAM(S): 2 INJECTION INTRAMUSCULAR; INTRAVENOUS; SUBCUTANEOUS at 18:00

## 2021-06-16 RX ADMIN — HYDROMORPHONE HYDROCHLORIDE 1 MILLIGRAM(S): 2 INJECTION INTRAMUSCULAR; INTRAVENOUS; SUBCUTANEOUS at 17:46

## 2021-06-16 NOTE — ASU DISCHARGE PLAN (ADULT/PEDIATRIC) - CARE PROVIDER_API CALL
Corina Feng; MPH)  Orthopaedic Surgery  [address], [address]  [place] [address]  Phone: [phone]  Fax: [phone]  Follow Up Time:     Carmelo Kumar)  ColonRectal Surgery; Plastic Surgery; Surgery; Surgery of the Hand  [address], [address]  [place] [address]  Phone: [phone]  Fax: [phone]  Follow Up Time:

## 2021-06-16 NOTE — BRIEF OPERATIVE NOTE - OPERATION/FINDINGS
left iliac crest bone graft retrieval  Left radius reconstruction with iliac crest autograft, plate, and screws  Rest of procedure as performed by plastic surgery

## 2021-06-16 NOTE — ASU DISCHARGE PLAN (ADULT/PEDIATRIC) - ASU DC SPECIAL INSTRUCTIONSFT
WOUND CARE: Do not remove dressing until follow up. Keep dressing/incision clean, dry, and intact. Perform daily wet to dry dressing to left dorsal forearm wound.  Please empty and record drain output from your left groin drain every 8 hours.  BATHING: Please do not submerge wound underwater. You may shower and/or sponge bathe. Do not scrub incisions or apply lotions.  ACTIVITY: Your weight bearing status is nonweightbearing left upper extremity in splint and sling. You are able to bear weight on lower extremities. If you are taking narcotic pain medication (such as Percocet), do NOT drive a car, operate machinery or make important decisions.  DIET: Return to your usual diet.  NOTIFY YOUR SURGEON IF: You have any bleeding that does not stop, any pus draining from your wound, any fever (over 100.4 F) or chills, persistent nausea/vomiting, persistent diarrhea, or if your pain is not controlled on your discharge pain medications.  PAIN CONTROL: Please take medication as prescribed.   FOLLOW-UP:  1. Follow-up with Dr. Feng within 1-2 weeks of discharge.  Please call office for appointment  2. Follow-up with Dr. Kumar within 1-2 weeks of discharge

## 2021-06-18 RX ORDER — SULFAMETHOXAZOLE AND TRIMETHOPRIM 800; 160 MG/1; MG/1
800-160 TABLET ORAL
Qty: 112 | Refills: 0 | Status: ACTIVE | COMMUNITY
Start: 2021-02-22 | End: 1900-01-01

## 2021-06-23 ENCOUNTER — APPOINTMENT (OUTPATIENT)
Dept: PLASTIC SURGERY | Facility: CLINIC | Age: 36
End: 2021-06-23

## 2021-06-23 VITALS — WEIGHT: 205 LBS | TEMPERATURE: 97.2 F | HEIGHT: 66 IN | BODY MASS INDEX: 32.95 KG/M2

## 2021-06-23 NOTE — REASON FOR VISIT
[Post Op: _________] : a [unfilled] post op visit [FreeTextEntry1] : s/p External fixation of L forearm, placement of pins, Left groin pedicled flap reconstruction DOS: 05/25/21

## 2021-06-29 ENCOUNTER — APPOINTMENT (OUTPATIENT)
Dept: PLASTIC SURGERY | Facility: CLINIC | Age: 36
End: 2021-06-29
Payer: MEDICAID

## 2021-06-29 PROCEDURE — 99024 POSTOP FOLLOW-UP VISIT: CPT

## 2021-07-06 ENCOUNTER — APPOINTMENT (OUTPATIENT)
Dept: ORTHOPEDIC SURGERY | Facility: CLINIC | Age: 36
End: 2021-07-06
Payer: MEDICAID

## 2021-07-06 PROCEDURE — 73090 X-RAY EXAM OF FOREARM: CPT | Mod: LT

## 2021-07-06 PROCEDURE — 99024 POSTOP FOLLOW-UP VISIT: CPT

## 2021-07-12 ENCOUNTER — APPOINTMENT (OUTPATIENT)
Dept: ORTHOPEDIC SURGERY | Facility: CLINIC | Age: 36
End: 2021-07-12
Payer: MEDICAID

## 2021-07-12 DIAGNOSIS — M54.16 RADICULOPATHY, LUMBAR REGION: ICD-10-CM

## 2021-07-12 PROCEDURE — 99214 OFFICE O/P EST MOD 30 MIN: CPT | Mod: 95

## 2021-07-20 ENCOUNTER — APPOINTMENT (OUTPATIENT)
Dept: PLASTIC SURGERY | Facility: CLINIC | Age: 36
End: 2021-07-20

## 2021-07-20 VITALS
HEIGHT: 66 IN | TEMPERATURE: 98.3 F | OXYGEN SATURATION: 97 % | DIASTOLIC BLOOD PRESSURE: 78 MMHG | WEIGHT: 200 LBS | HEART RATE: 98 BPM | BODY MASS INDEX: 32.14 KG/M2 | SYSTOLIC BLOOD PRESSURE: 145 MMHG

## 2021-07-20 RX ORDER — SULFAMETHOXAZOLE AND TRIMETHOPRIM 800; 160 MG/1; MG/1
800-160 TABLET ORAL
Qty: 20 | Refills: 0 | Status: ACTIVE | COMMUNITY
Start: 2021-07-20 | End: 1900-01-01

## 2021-07-26 ENCOUNTER — APPOINTMENT (OUTPATIENT)
Dept: PLASTIC SURGERY | Facility: CLINIC | Age: 36
End: 2021-07-26
Payer: MEDICAID

## 2021-07-26 DIAGNOSIS — I96 GANGRENE, NOT ELSEWHERE CLASSIFIED: ICD-10-CM

## 2021-07-26 PROCEDURE — 99024 POSTOP FOLLOW-UP VISIT: CPT

## 2021-07-27 ENCOUNTER — APPOINTMENT (OUTPATIENT)
Dept: ORTHOPEDIC SURGERY | Facility: CLINIC | Age: 36
End: 2021-07-27
Payer: MEDICAID

## 2021-07-27 PROBLEM — I96 DRY GANGRENE: Status: ACTIVE | Noted: 2020-07-08

## 2021-07-27 PROCEDURE — 99024 POSTOP FOLLOW-UP VISIT: CPT

## 2021-07-27 PROCEDURE — 73090 X-RAY EXAM OF FOREARM: CPT | Mod: LT

## 2021-07-27 RX ORDER — SULFAMETHOXAZOLE AND TRIMETHOPRIM 800; 160 MG/1; MG/1
800-160 TABLET ORAL TWICE DAILY
Qty: 56 | Refills: 0 | Status: ACTIVE | COMMUNITY
Start: 2021-07-27 | End: 1900-01-01

## 2021-08-03 ENCOUNTER — APPOINTMENT (OUTPATIENT)
Dept: ORTHOPEDIC SURGERY | Facility: CLINIC | Age: 36
End: 2021-08-03

## 2021-08-30 ENCOUNTER — OUTPATIENT (OUTPATIENT)
Dept: OUTPATIENT SERVICES | Facility: HOSPITAL | Age: 36
LOS: 1 days | End: 2021-08-30
Payer: COMMERCIAL

## 2021-08-30 ENCOUNTER — APPOINTMENT (OUTPATIENT)
Dept: MRI IMAGING | Facility: CLINIC | Age: 36
End: 2021-08-30
Payer: MEDICAID

## 2021-08-30 DIAGNOSIS — Z98.890 OTHER SPECIFIED POSTPROCEDURAL STATES: Chronic | ICD-10-CM

## 2021-08-30 DIAGNOSIS — M54.16 RADICULOPATHY, LUMBAR REGION: ICD-10-CM

## 2021-08-30 PROCEDURE — 72148 MRI LUMBAR SPINE W/O DYE: CPT | Mod: 26

## 2021-08-30 PROCEDURE — 72148 MRI LUMBAR SPINE W/O DYE: CPT

## 2021-09-14 ENCOUNTER — APPOINTMENT (OUTPATIENT)
Dept: ORTHOPEDIC SURGERY | Facility: CLINIC | Age: 36
End: 2021-09-14
Payer: MEDICAID

## 2021-09-14 DIAGNOSIS — M48.07 SPINAL STENOSIS, LUMBOSACRAL REGION: ICD-10-CM

## 2021-09-14 DIAGNOSIS — M51.36 OTHER INTERVERTEBRAL DISC DEGENERATION, LUMBAR REGION: ICD-10-CM

## 2021-09-14 PROCEDURE — 99214 OFFICE O/P EST MOD 30 MIN: CPT | Mod: 24

## 2021-09-14 PROCEDURE — 99072 ADDL SUPL MATRL&STAF TM PHE: CPT

## 2021-10-11 ENCOUNTER — APPOINTMENT (OUTPATIENT)
Dept: ORTHOPEDIC SURGERY | Facility: CLINIC | Age: 36
End: 2021-10-11
Payer: MEDICAID

## 2021-10-11 VITALS — WEIGHT: 200 LBS | HEIGHT: 66 IN | BODY MASS INDEX: 32.14 KG/M2

## 2021-10-11 PROCEDURE — 73090 X-RAY EXAM OF FOREARM: CPT | Mod: LT

## 2021-10-11 PROCEDURE — 99072 ADDL SUPL MATRL&STAF TM PHE: CPT

## 2021-10-11 PROCEDURE — 99214 OFFICE O/P EST MOD 30 MIN: CPT | Mod: 25

## 2021-10-11 RX ORDER — SULFAMETHOXAZOLE AND TRIMETHOPRIM 800; 160 MG/1; MG/1
800-160 TABLET ORAL TWICE DAILY
Qty: 60 | Refills: 0 | Status: ACTIVE | COMMUNITY
Start: 2021-10-11 | End: 1900-01-01

## 2021-10-17 NOTE — DISCHARGE NOTE PROVIDER - NSDCHHCONTRAHOME_GEN_ALL_CORE
Pt arrived to ed via EMS for evaluation for dizziness. Pt tried to get up to go to the bathroom and started getting dizzy. Pt is alert and oriented x4, follows commands and verbally responsive.     Activity restrictions due to illness/surgery

## 2021-12-07 ENCOUNTER — OUTPATIENT (OUTPATIENT)
Dept: OUTPATIENT SERVICES | Facility: HOSPITAL | Age: 36
LOS: 1 days | End: 2021-12-07
Payer: COMMERCIAL

## 2021-12-07 ENCOUNTER — APPOINTMENT (OUTPATIENT)
Dept: CT IMAGING | Facility: CLINIC | Age: 36
End: 2021-12-07

## 2021-12-07 DIAGNOSIS — Z00.8 ENCOUNTER FOR OTHER GENERAL EXAMINATION: ICD-10-CM

## 2021-12-07 DIAGNOSIS — S52.502N: ICD-10-CM

## 2021-12-07 DIAGNOSIS — Z98.890 OTHER SPECIFIED POSTPROCEDURAL STATES: Chronic | ICD-10-CM

## 2021-12-07 PROCEDURE — 73200 CT UPPER EXTREMITY W/O DYE: CPT

## 2021-12-07 PROCEDURE — 73200 CT UPPER EXTREMITY W/O DYE: CPT | Mod: 26,LT

## 2021-12-08 RX ORDER — SULFAMETHOXAZOLE AND TRIMETHOPRIM 800; 160 MG/1; MG/1
800-160 TABLET ORAL TWICE DAILY
Qty: 60 | Refills: 0 | Status: ACTIVE | COMMUNITY
Start: 2021-12-08 | End: 1900-01-01

## 2021-12-19 NOTE — H&P PST ADULT - MUSCULOSKELETAL COMMENTS
2 left external fixator in place Patient with one or more new problems requiring additional work-up/treatment. left arm pain left external fixator in place, left groin and arm dressing CDI

## 2021-12-22 ENCOUNTER — APPOINTMENT (OUTPATIENT)
Dept: PAIN MANAGEMENT | Facility: CLINIC | Age: 36
End: 2021-12-22
Payer: MEDICAID

## 2021-12-22 VITALS
HEIGHT: 66 IN | SYSTOLIC BLOOD PRESSURE: 145 MMHG | WEIGHT: 206 LBS | DIASTOLIC BLOOD PRESSURE: 83 MMHG | HEART RATE: 92 BPM | BODY MASS INDEX: 33.11 KG/M2

## 2021-12-22 PROCEDURE — 99072 ADDL SUPL MATRL&STAF TM PHE: CPT

## 2021-12-22 PROCEDURE — 99205 OFFICE O/P NEW HI 60 MIN: CPT

## 2021-12-22 NOTE — ASSESSMENT
[FreeTextEntry1] : 35 y/o M with Pmhx substance abuse, chronic osteomyelitis Left radius/ulna s/p multiple surgical procedures with chronic pain in left lower back radiating to left lateral thigh. Although patient is being treated for chronic osteomyelitis LUE, the wound is actively malodorous which he feels has worsened in the past month.  Additionally , physical exam today - more consistent with mechanical pain to left hip, no paraspinal muscle, midline tenderness or trigger points on exam today. \par \par - Case d/w Dr. Ambriz and will be referred to Ortho- hip for further evaluation.  Given h/o chronic osteomyelitis as well as well as multiple surgical procedures and iliac bone graft remotely , osteomyelitis is part of differential. \par -Fu with Dr. Feng \par - patient may follow up after ortho evaluation if needed. \par \par DERRICKFLO CHRISTY  was seen and examined with Dr. Aguirre who agrees with the assessment and plan.\par

## 2021-12-22 NOTE — PHYSICAL EXAM
[FreeTextEntry1] : General appearance: Well nourished and with attention to grooming.No signs of distress. No signs of toxicity.\par Neck/spine: Examination of the cervical spine reveals normal range of motion in the neck, no midline tenderness, no paraspinal muscle tenderness, no facet tenderness, negative Spurling's bilaterally. Examination of the lumbar spine reveals normal range of motion including flexion, extension and lateral rotation. No midline tenderness, no paraspinal muscle tenderness, negative facet loading, no tenderness or sciatic notch,  no tenderness of bilateral greater trochanters,  negative SLRT bilaterally.  \par CV: RRR.\par Skin: + dressing C/D/I left forearm , but malodorous \par Musculoskeletal: No joint deformities, no scoliosis, lordosis, kyphosis, pes cavus or hammer toes.\par Extremities: + 1 + peripheral edema bilaterally \par \par Neurological exam:\par Mental status: Patient is alert, attentive and oriented X3. Speech is coherent and fluent without dysarthria or aphasia.  Affect normal.\par CN: Pupils were 6 mm and reactive to light. Extraocular movements were full. Visual fields are intact to confrontation. No nystagmus. There was no face, jaw, palate or tongue weakness or atrophy. Facial sensation was normal and symmetric. Hearing was grossly intact. Shoulder shrug was normal.\par Motor exam revealed normal bulk and tone. There is no evidence of atrophy or fasciculations. There is no pronator drift. Manual muscle testing revealed 5/5 strength throughout including neck flexion/extension and proximal and distal muscles of the right arm and right leg , LUE - trace edema arom hand and wrist limited ,  left  LLE IR/ER, hip flexion limited secondary to pain and guarding.  \par Sensory exam demonstrated was normal to touch, pin, proprioception, and vibration in arms and legs. Romberg was negative.\par DTRs: 2+ b/l biceps, 2+ triceps, 2 + brachioradialis, 2+ patellar, and 2+ ankle reflexes. Plantar responses were flexor bilaterally. No clonus, Herrera's or crossed adductor response.\par Gait: Antalgic gait with decreased hip flexion \par

## 2021-12-22 NOTE — HISTORY OF PRESENT ILLNESS
[FreeTextEntry1] : JANNET CHRISTY is a 36 year RH male with a Pmhx of left distal radial and ulnar fracture c/b osteomyelitis being tx by Dr. Feng who presents for initial evaluation of chronic back pain since Feb 2019.  He has had constant daily pain since Feb 2019 somewhat improved over the summer but has been worsening over the last few months. Lower back pain radiating down left buttock and thigh down to knee 5/10 on average but up to 8/10 by the evening, associated with spasm.  + numbness left hip post operatively.  he has seen Dr. Ambriz who ordered MRI L spine and recommend pain eval.  \par \par He tried PT which worsened pain.  He has never had trigger point injections or acupuncture. \par \par Current meds: Bactrim \par \par Social hx:  Single but lives with life partner for the last 18 years in [place]. 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  Active smoker 20 + PYH, denies etoh .  He has a prior history of opioid abuse, OxyContin and heroin IV and last used 2-3 years ago. \par \par \par \par \par \par \par \par

## 2022-01-04 ENCOUNTER — APPOINTMENT (OUTPATIENT)
Dept: ORTHOPEDIC SURGERY | Facility: CLINIC | Age: 37
End: 2022-01-04
Payer: MEDICAID

## 2022-01-04 PROCEDURE — 99214 OFFICE O/P EST MOD 30 MIN: CPT | Mod: 25

## 2022-01-04 PROCEDURE — 99072 ADDL SUPL MATRL&STAF TM PHE: CPT

## 2022-01-04 RX ORDER — CLINDAMYCIN HYDROCHLORIDE 300 MG/1
300 CAPSULE ORAL
Qty: 90 | Refills: 2 | Status: ACTIVE | COMMUNITY
Start: 2022-01-04 | End: 1900-01-01

## 2022-01-19 ENCOUNTER — APPOINTMENT (OUTPATIENT)
Dept: PLASTIC SURGERY | Facility: CLINIC | Age: 37
End: 2022-01-19
Payer: MEDICAID

## 2022-01-19 VITALS — WEIGHT: 206 LBS | HEIGHT: 66 IN | BODY MASS INDEX: 33.11 KG/M2 | TEMPERATURE: 98 F

## 2022-01-19 PROCEDURE — 99072 ADDL SUPL MATRL&STAF TM PHE: CPT

## 2022-01-19 PROCEDURE — 99213 OFFICE O/P EST LOW 20 MIN: CPT

## 2022-01-23 PROBLEM — M25.552 LEFT HIP PAIN: Status: ACTIVE | Noted: 2022-01-23

## 2022-01-24 ENCOUNTER — APPOINTMENT (OUTPATIENT)
Dept: ORTHOPEDIC SURGERY | Facility: CLINIC | Age: 37
End: 2022-01-24
Payer: MEDICAID

## 2022-01-24 VITALS
SYSTOLIC BLOOD PRESSURE: 153 MMHG | HEIGHT: 66 IN | DIASTOLIC BLOOD PRESSURE: 97 MMHG | OXYGEN SATURATION: 99 % | BODY MASS INDEX: 33.11 KG/M2 | WEIGHT: 206 LBS | TEMPERATURE: 98 F

## 2022-01-24 DIAGNOSIS — M25.552 PAIN IN LEFT HIP: ICD-10-CM

## 2022-01-24 PROCEDURE — 99213 OFFICE O/P EST LOW 20 MIN: CPT

## 2022-01-24 PROCEDURE — 99072 ADDL SUPL MATRL&STAF TM PHE: CPT

## 2022-01-24 PROCEDURE — 73502 X-RAY EXAM HIP UNI 2-3 VIEWS: CPT

## 2022-01-24 RX ORDER — MELOXICAM 15 MG/1
15 TABLET ORAL
Qty: 30 | Refills: 1 | Status: ACTIVE | COMMUNITY
Start: 2021-03-22 | End: 1900-01-01

## 2022-01-25 ENCOUNTER — APPOINTMENT (OUTPATIENT)
Dept: ORTHOPEDIC SURGERY | Facility: CLINIC | Age: 37
End: 2022-01-25

## 2022-04-28 ENCOUNTER — APPOINTMENT (OUTPATIENT)
Dept: ORTHOPEDIC SURGERY | Facility: CLINIC | Age: 37
End: 2022-04-28
Payer: MEDICAID

## 2022-04-28 DIAGNOSIS — T84.498A OTHER MECHANICAL COMPLICATION OF OTHER INTERNAL ORTHOPEDIC DEVICES, IMPLANTS AND GRAFTS, INITIAL ENCOUNTER: ICD-10-CM

## 2022-04-28 PROCEDURE — 99214 OFFICE O/P EST MOD 30 MIN: CPT | Mod: 25

## 2022-04-28 PROCEDURE — 73090 X-RAY EXAM OF FOREARM: CPT | Mod: LT

## 2022-05-11 NOTE — ED ADULT NURSE NOTE - PAIN RATING/NUMBER SCALE (0-10): ACTIVITY
0 Ketoconazole Counseling:   Patient counseled regarding improving absorption with orange juice.  Adverse effects include but are not limited to breast enlargement, headache, diarrhea, nausea, upset stomach, liver function test abnormalities, taste disturbance, and stomach pain.  There is a rare possibility of liver failure that can occur when taking ketoconazole. The patient understands that monitoring of LFTs may be required, especially at baseline. The patient verbalized understanding of the proper use and possible adverse effects of ketoconazole.  All of the patient's questions and concerns were addressed.

## 2022-06-20 ENCOUNTER — OUTPATIENT (OUTPATIENT)
Dept: OUTPATIENT SERVICES | Facility: HOSPITAL | Age: 37
LOS: 1 days | End: 2022-06-20

## 2022-06-20 VITALS
HEIGHT: 65 IN | OXYGEN SATURATION: 99 % | HEART RATE: 68 BPM | TEMPERATURE: 98 F | SYSTOLIC BLOOD PRESSURE: 129 MMHG | WEIGHT: 203.93 LBS | RESPIRATION RATE: 18 BRPM | DIASTOLIC BLOOD PRESSURE: 75 MMHG

## 2022-06-20 DIAGNOSIS — S51.809A UNSPECIFIED OPEN WOUND OF UNSPECIFIED FOREARM, INITIAL ENCOUNTER: ICD-10-CM

## 2022-06-20 DIAGNOSIS — Z98.890 OTHER SPECIFIED POSTPROCEDURAL STATES: Chronic | ICD-10-CM

## 2022-06-20 RX ORDER — SODIUM CHLORIDE 9 MG/ML
1000 INJECTION, SOLUTION INTRAVENOUS
Refills: 0 | Status: DISCONTINUED | OUTPATIENT
Start: 2022-07-05 | End: 2022-07-19

## 2022-06-20 NOTE — H&P PST ADULT - ASSESSMENT
38 yo male scheduled left volar plate removal, distal graft site debridement, revision open reduction internal fixation with Dr. Feng.

## 2022-06-20 NOTE — H&P PST ADULT - MUSCULOSKELETAL
details… left arm/hand pitting edema, fingers warm to touch, + capillary refill/no joint warmth/joint swelling

## 2022-06-20 NOTE — H&P PST ADULT - MUSCULOSKELETAL COMMENTS
left arm open wound covered by dressing, pt. reports self care-on Clindamycin rt pre-op diagnosis left internal fixator in place

## 2022-06-20 NOTE — H&P PST ADULT - SKIN COMMENTS
right forearm scars , dressing in place left wrist rt pre-op diagnosis right forearm scarring  , dressing in place left wrist rt pre-op diagnosis

## 2022-06-20 NOTE — H&P PST ADULT - NSICDXPASTSURGICALHX_GEN_ALL_CORE_FT
PAST SURGICAL HISTORY:  H/O wrist surgery left groin flat division and closure, LT radius ORIF with iliac crest bone graft June 2021    Status post debridement left arm, placement of external fixator 04/2021   I&D left forearm wound, antibiotic spacer placement re-application of external fixator, groin flap 5/25/21

## 2022-06-20 NOTE — H&P PST ADULT - HISTORY OF PRESENT ILLNESS
38 y/o male with "hx of heroin IV use, last use 4 yrs ago.  For the past 5 yrs has left forearm wound, the summer of 2020 bone became exposed, has been on abx since then."  S/P left forearm wound debridement and placement of external fixator on 4/6/2021 and I&D left forearm wound, antibiotic spacer placement re-application of external fixator, groin flap 5/25/2021 &  left groin flat division and closure, LT radius ORIF with iliac crest bone graft June 2021 now presents for scheduled left volar plate removal, distal graft site debridement, revision open reduction internal fixation with Dr. Feng.

## 2022-06-20 NOTE — H&P PST ADULT - LAB RESULTS AND INTERPRETATION
T&S, CBC, CMP T&S, CBC, CMP unable to draw due to scarring of right forearm and swelling of left forearm.     Pt. will return to PST on 6/22/22 for additional attempt.

## 2022-06-20 NOTE — H&P PST ADULT - CARDIOVASCULAR
[FreeTextEntry1] : This is an 81-year-old right-handed female who was kindly referred by Dr. Licea to movement disorders clinic to evaluate bilateral leg spasm.  At this visit she is accompanied by her son Dwaine\par History is obtained from patient and from review of records\par Patient states that about last year she noticed spasms in her leg more on the left than the right especially when she was trying to do certain tasks such as wear her shoes, slacks or moves her leg a certain way.  She describes this as a quick jerky movement.  Nonpainful.  She denies any difficulty in activities such as walking getting up from a chair turning in bed.  She denies any restless leg-like symptoms.  No weakness.  No change in bowel or bladder habits.  She denies any back pain.  She denies any tremors.  She has no difficulty using utensils.\par She denies any anosmia, dysphagia, drooling, sleep disorders or fall\par She denies any weight loss or change in appetite\par \par She does report some change in her handwriting whereas its not as needed may be getting smaller.  She has difficulty with tiny hooks of jewelry and earrings.\par She denies any new medications.  She denies any history of use of antidopaminergic medication\par \par Review of systems-a complete review of systems is performed and is negative except as listed in HPI\par \par Past medical history A. fib\par Family history unremarkable\par  \par \par  normal/regular rate and rhythm/S1 S2 present/no gallops/no rub/no murmur details…

## 2022-06-20 NOTE — H&P PST ADULT - PROBLEM SELECTOR PLAN 1
-Scheduled left volar plate removal, distal graft site debridement, revision open reduction internal fixation with Dr. Feng on 7/5/2022.   -Verbal and written pre-op instructions provided to patient. Patient verbalized understanding and will call surgeons office for revised instructions if surgery is rescheduled.   -Pepcid for GI prophylaxis provided.   -Patient given verbal and written instruction with teach back on chlorhexidine shampoo, and the patient verbalized understanding with return demonstration.   -Patient aware of need for COVID testing prior to  procedure at a [facility], given list of locations and advised to get tested within 3 to 5 days of procedure.

## 2022-06-23 ENCOUNTER — OUTPATIENT (OUTPATIENT)
Dept: OUTPATIENT SERVICES | Facility: HOSPITAL | Age: 37
LOS: 1 days | End: 2022-06-23

## 2022-06-23 ENCOUNTER — APPOINTMENT (OUTPATIENT)
Dept: PLASTIC SURGERY | Facility: CLINIC | Age: 37
End: 2022-06-23
Payer: MEDICAID

## 2022-06-23 VITALS
HEART RATE: 69 BPM | SYSTOLIC BLOOD PRESSURE: 159 MMHG | DIASTOLIC BLOOD PRESSURE: 91 MMHG | OXYGEN SATURATION: 98 % | WEIGHT: 204 LBS | BODY MASS INDEX: 32.78 KG/M2 | HEIGHT: 66 IN | TEMPERATURE: 98.4 F

## 2022-06-23 DIAGNOSIS — Z98.890 OTHER SPECIFIED POSTPROCEDURAL STATES: Chronic | ICD-10-CM

## 2022-06-23 DIAGNOSIS — S51.809A UNSPECIFIED OPEN WOUND OF UNSPECIFIED FOREARM, INITIAL ENCOUNTER: ICD-10-CM

## 2022-06-23 LAB
ALBUMIN SERPL ELPH-MCNC: 4 G/DL — SIGNIFICANT CHANGE UP (ref 3.3–5)
ALP SERPL-CCNC: 111 U/L — SIGNIFICANT CHANGE UP (ref 40–120)
ALT FLD-CCNC: 19 U/L — SIGNIFICANT CHANGE UP (ref 4–41)
ANION GAP SERPL CALC-SCNC: 12 MMOL/L — SIGNIFICANT CHANGE UP (ref 7–14)
AST SERPL-CCNC: 21 U/L — SIGNIFICANT CHANGE UP (ref 4–40)
BILIRUB SERPL-MCNC: <0.2 MG/DL — SIGNIFICANT CHANGE UP (ref 0.2–1.2)
BLD GP AB SCN SERPL QL: NEGATIVE — SIGNIFICANT CHANGE UP
BUN SERPL-MCNC: 12 MG/DL — SIGNIFICANT CHANGE UP (ref 7–23)
CALCIUM SERPL-MCNC: 9.4 MG/DL — SIGNIFICANT CHANGE UP (ref 8.4–10.5)
CHLORIDE SERPL-SCNC: 99 MMOL/L — SIGNIFICANT CHANGE UP (ref 98–107)
CO2 SERPL-SCNC: 23 MMOL/L — SIGNIFICANT CHANGE UP (ref 22–31)
CREAT SERPL-MCNC: 1.02 MG/DL — SIGNIFICANT CHANGE UP (ref 0.5–1.3)
EGFR: 96 ML/MIN/1.73M2 — SIGNIFICANT CHANGE UP
GLUCOSE SERPL-MCNC: 111 MG/DL — HIGH (ref 70–99)
HCT VFR BLD CALC: 45.4 % — SIGNIFICANT CHANGE UP (ref 39–50)
HGB BLD-MCNC: 13.9 G/DL — SIGNIFICANT CHANGE UP (ref 13–17)
MCHC RBC-ENTMCNC: 23.3 PG — LOW (ref 27–34)
MCHC RBC-ENTMCNC: 30.6 GM/DL — LOW (ref 32–36)
MCV RBC AUTO: 76 FL — LOW (ref 80–100)
NRBC # BLD: 0 /100 WBCS — SIGNIFICANT CHANGE UP
NRBC # FLD: 0 K/UL — SIGNIFICANT CHANGE UP
PLATELET # BLD AUTO: 311 K/UL — SIGNIFICANT CHANGE UP (ref 150–400)
POTASSIUM SERPL-MCNC: 4.4 MMOL/L — SIGNIFICANT CHANGE UP (ref 3.5–5.3)
POTASSIUM SERPL-SCNC: 4.4 MMOL/L — SIGNIFICANT CHANGE UP (ref 3.5–5.3)
PROT SERPL-MCNC: 7.8 G/DL — SIGNIFICANT CHANGE UP (ref 6–8.3)
RBC # BLD: 5.97 M/UL — HIGH (ref 4.2–5.8)
RBC # FLD: 16.2 % — HIGH (ref 10.3–14.5)
RH IG SCN BLD-IMP: POSITIVE — SIGNIFICANT CHANGE UP
SODIUM SERPL-SCNC: 134 MMOL/L — LOW (ref 135–145)
WBC # BLD: 6.07 K/UL — SIGNIFICANT CHANGE UP (ref 3.8–10.5)
WBC # FLD AUTO: 6.07 K/UL — SIGNIFICANT CHANGE UP (ref 3.8–10.5)

## 2022-06-23 PROCEDURE — XXXXX: CPT | Mod: 1L

## 2022-07-01 LAB — SARS-COV-2 RNA SPEC QL NAA+PROBE: SIGNIFICANT CHANGE UP

## 2022-07-01 NOTE — ASU PATIENT PROFILE, ADULT - NSCAFFEINEWITH_GEN_ALL_CORE_SD
Metronidazole Pregnancy And Lactation Text: This medication is Pregnancy Category B and considered safe during pregnancy.  It is also excreted in breast milk. Ketoconazole Counseling:   Patient counseled regarding improving absorption with orange juice.  Adverse effects include but are not limited to breast enlargement, headache, diarrhea, nausea, upset stomach, liver function test abnormalities, taste disturbance, and stomach pain.  There is a rare possibility of liver failure that can occur when taking ketoconazole. The patient understands that monitoring of LFTs may be required, especially at baseline. The patient verbalized understanding of the proper use and possible adverse effects of ketoconazole.  All of the patient's questions and concerns were addressed. Birth Control Pills Pregnancy And Lactation Text: This medication should be avoided if pregnant and for the first 30 days post-partum. Cosentyx Counseling:  I discussed with the patient the risks of Cosentyx including but not limited to worsening of Crohn's disease, immunosuppression, allergic reactions and infections.  The patient understands that monitoring is required including a PPD at baseline and must alert us or the primary physician if symptoms of infection or other concerning signs are noted. Hydroxychloroquine Pregnancy And Lactation Text: This medication has been shown to cause fetal harm but it isn't assigned a Pregnancy Risk Category. There are small amounts excreted in breast milk. Eucrisa Pregnancy And Lactation Text: This medication has not been assigned a Pregnancy Risk Category but animal studies failed to show danger with the topical medication. It is unknown if the medication is excreted in breast milk. Cellcept Pregnancy And Lactation Text: This medication is Pregnancy Category D and isn't considered safe during pregnancy. It is unknown if this medication is excreted in breast milk. High Dose Vitamin A Counseling: Side effects reviewed, pt to contact office should one occur. Spironolactone Counseling: Patient advised regarding risks of diarrhea, abdominal pain, hyperkalemia, birth defects (for female patients), liver toxicity and renal toxicity. The patient may need blood work to monitor liver and kidney function and potassium levels while on therapy. The patient verbalized understanding of the proper use and possible adverse effects of spironolactone.  All of the patient's questions and concerns were addressed. Simponi Counseling:  I discussed with the patient the risks of golimumab including but not limited to myelosuppression, immunosuppression, autoimmune hepatitis, demyelinating diseases, lymphoma, and serious infections.  The patient understands that monitoring is required including a PPD at baseline and must alert us or the primary physician if symptoms of infection or other concerning signs are noted. Arava Counseling:  Patient counseled regarding adverse effects of Arava including but not limited to nausea, vomiting, abnormalities in liver function tests. Patients may develop mouth sores, rash, diarrhea, and abnormalities in blood counts. The patient understands that monitoring is required including LFTs and blood counts.  There is a rare possibility of scarring of the liver and lung problems that can occur when taking methotrexate. Persistent nausea, loss of appetite, pale stools, dark urine, cough, and shortness of breath should be reported immediately. Patient advised to discontinue Arava treatment and consult with a physician prior to attempting conception. The patient will have to undergo a treatment to eliminate Arava from the body prior to conception. Solaraze Pregnancy And Lactation Text: This medication is Pregnancy Category B and is considered safe. There is some data to suggest avoiding during the third trimester. It is unknown if this medication is excreted in breast milk. High Dose Vitamin A Pregnancy And Lactation Text: High dose vitamin A therapy is contraindicated during pregnancy and breast feeding. Niacinamide Counseling: I recommended taking niacin or niacinamide, also know as vitamin B3, twice daily. Recent evidence suggests that taking vitamin B3 (500 mg twice daily) can reduce the risk of actinic keratoses and non-melanoma skin cancers. Side effects of vitamin B3 include flushing and headache. Ketoconazole Pregnancy And Lactation Text: This medication is Pregnancy Category C and it isn't know if it is safe during pregnancy. It is also excreted in breast milk and breast feeding isn't recommended. Minocycline Counseling: Patient advised regarding possible photosensitivity and discoloration of the teeth, skin, lips, tongue and gums.  Patient instructed to avoid sunlight, if possible.  When exposed to sunlight, patients should wear protective clothing, sunglasses, and sunscreen.  The patient was instructed to call the office immediately if the following severe adverse effects occur:  hearing changes, easy bruising/bleeding, severe headache, or vision changes.  The patient verbalized understanding of the proper use and possible adverse effects of minocycline.  All of the patient's questions and concerns were addressed. Topical Retinoid counseling:  Patient advised to apply a pea-sized amount only at bedtime and wait 30 minutes after washing their face before applying.  If too drying, patient may add a non-comedogenic moisturizer. The patient verbalized understanding of the proper use and possible adverse effects of retinoids.  All of the patient's questions and concerns were addressed. na Simponi Pregnancy And Lactation Text: The risk during pregnancy and breastfeeding is uncertain with this medication. Cosentyx Pregnancy And Lactation Text: This medication is Pregnancy Category B and is considered safe during pregnancy. It is unknown if this medication is excreted in breast milk. Cyclophosphamide Counseling:  I discussed with the patient the risks of cyclophosphamide including but not limited to hair loss, hormonal abnormalities, decreased fertility, abdominal pain, diarrhea, nausea and vomiting, bone marrow suppression and infection. The patient understands that monitoring is required while taking this medication. Hydroquinone Counseling:  Patient advised that medication may result in skin irritation, lightening (hypopigmentation), dryness, and burning.  In the event of skin irritation, the patient was advised to reduce the amount of the drug applied or use it less frequently.  Rarely, spots that are treated with hydroquinone can become darker (pseudoochronosis).  Should this occur, patient instructed to stop medication and call the office. The patient verbalized understanding of the proper use and possible adverse effects of hydroquinone.  All of the patient's questions and concerns were addressed. Terbinafine Counseling: Patient counseling regarding adverse effects of terbinafine including but not limited to headache, diarrhea, rash, upset stomach, liver function test abnormalities, itching, taste/smell disturbance, nausea, abdominal pain, and flatulence.  There is a rare possibility of liver failure that can occur when taking terbinafine.  The patient understands that a baseline LFT and kidney function test may be required. The patient verbalized understanding of the proper use and possible adverse effects of terbinafine.  All of the patient's questions and concerns were addressed. Minocycline Pregnancy And Lactation Text: This medication is Pregnancy Category D and not consider safe during pregnancy. It is also excreted in breast milk. Spironolactone Pregnancy And Lactation Text: This medication can cause feminization of the male fetus and should be avoided during pregnancy. The active metabolite is also found in breast milk. Arava Pregnancy And Lactation Text: This medication is Pregnancy Category X and is absolutely contraindicated during pregnancy. It is unknown if it is excreted in breast milk. Cyclophosphamide Pregnancy And Lactation Text: This medication is Pregnancy Category D and it isn't considered safe during pregnancy. This medication is excreted in breast milk. Dupixent Counseling: I discussed with the patient the risks of dupilumab including but not limited to eye infection and irritation, cold sores, injection site reactions, worsening of asthma, allergic reactions and increased risk of parasitic infection.  Live vaccines should be avoided while taking dupilumab. Dupilumab will also interact with certain medications such as warfarin and cyclosporine. The patient understands that monitoring is required and they must alert us or the primary physician if symptoms of infection or other concerning signs are noted. SSKI Counseling:  I discussed with the patient the risks of SSKI including but not limited to thyroid abnormalities, metallic taste, GI upset, fever, headache, acne, arthralgias, paraesthesias, lymphadenopathy, easy bleeding, arrhythmias, and allergic reaction. Clofazimine Counseling:  I discussed with the patient the risks of clofazimine including but not limited to skin and eye pigmentation, liver damage, nausea/vomiting, gastrointestinal bleeding and allergy. Stelara Counseling:  I discussed with the patient the risks of ustekinumab including but not limited to immunosuppression, malignancy, posterior leukoencephalopathy syndrome, and serious infections.  The patient understands that monitoring is required including a PPD at baseline and must alert us or the primary physician if symptoms of infection or other concerning signs are noted. Topical Retinoid Pregnancy And Lactation Text: This medication is Pregnancy Category C. It is unknown if this medication is excreted in breast milk. Imiquimod Counseling:  I discussed with the patient the risks of imiquimod including but not limited to erythema, scaling, itching, weeping, crusting, and pain.  Patient understands that the inflammatory response to imiquimod is variable from person to person and was educated regarded proper titration schedule.  If flu-like symptoms develop, patient knows to discontinue the medication and contact us. Azithromycin Counseling:  I discussed with the patient the risks of azithromycin including but not limited to GI upset, allergic reaction, drug rash, diarrhea, and yeast infections. Quinolones Counseling:  I discussed with the patient the risks of fluoroquinolones including but not limited to GI upset, allergic reaction, drug rash, diarrhea, dizziness, photosensitivity, yeast infections, liver function test abnormalities, tendonitis/tendon rupture. Terbinafine Pregnancy And Lactation Text: This medication is Pregnancy Category B and is considered safe during pregnancy. It is also excreted in breast milk and breast feeding isn't recommended. Dupixent Pregnancy And Lactation Text: This medication likely crosses the placenta but the risk for the fetus is uncertain. This medication is excreted in breast milk. Cyclosporine Counseling:  I discussed with the patient the risks of cyclosporine including but not limited to hypertension, gingival hyperplasia,myelosuppression, immunosuppression, liver damage, kidney damage, neurotoxicity, lymphoma, and serious infections. The patient understands that monitoring is required including baseline blood pressure, CBC, CMP, lipid panel and uric acid, and then 1-2 times monthly CMP and blood pressure. Clofazimine Pregnancy And Lactation Text: This medication is Pregnancy Category C and isn't considered safe during pregnancy. It is excreted in breast milk. Tazorac Counseling:  Patient advised that medication is irritating and drying.  Patient may need to apply sparingly and wash off after an hour before eventually leaving it on overnight.  The patient verbalized understanding of the proper use and possible adverse effects of tazorac.  All of the patient's questions and concerns were addressed. Sski Pregnancy And Lactation Text: This medication is Pregnancy Category D and isn't considered safe during pregnancy. It is excreted in breast milk. Quinolones Pregnancy And Lactation Text: This medication is Pregnancy Category C and it isn't know if it is safe during pregnancy. It is also excreted in breast milk. Tazorac Pregnancy And Lactation Text: This medication is not safe during pregnancy. It is unknown if this medication is excreted in breast milk. Azithromycin Pregnancy And Lactation Text: This medication is considered safe during pregnancy and is also secreted in breast milk. Taltz Counseling: I discussed with the patient the risks of ixekizumab including but not limited to immunosuppression, serious infections, worsening of inflammatory bowel disease and drug reactions.  The patient understands that monitoring is required including a PPD at baseline and must alert us or the primary physician if symptoms of infection or other concerning signs are noted. Enbrel Counseling:  I discussed with the patient the risks of etanercept including but not limited to myelosuppression, immunosuppression, autoimmune hepatitis, demyelinating diseases, lymphoma, and infections.  The patient understands that monitoring is required including a PPD at baseline and must alert us or the primary physician if symptoms of infection or other concerning signs are noted. Cyclosporine Pregnancy And Lactation Text: This medication is Pregnancy Category C and it isn't know if it is safe during pregnancy. This medication is excreted in breast milk. Hydroxyzine Counseling: Patient advised that the medication is sedating and not to drive a car after taking this medication.  Patient informed of potential adverse effects including but not limited to dry mouth, urinary retention, and blurry vision.  The patient verbalized understanding of the proper use and possible adverse effects of hydroxyzine.  All of the patient's questions and concerns were addressed. Thalidomide Counseling: I discussed with the patient the risks of thalidomide including but not limited to birth defects, anxiety, weakness, chest pain, dizziness, cough and severe allergy. Colchicine Counseling:  Patient counseled regarding adverse effects including but not limited to stomach upset (nausea, vomiting, stomach pain, or diarrhea).  Patient instructed to limit alcohol consumption while taking this medication.  Colchicine may reduce blood counts especially with prolonged use.  The patient understands that monitoring of kidney function and blood counts may be required, especially at baseline. The patient verbalized understanding of the proper use and possible adverse effects of colchicine.  All of the patient's questions and concerns were addressed. Cimetidine Counseling:  I discussed with the patient the risks of Cimetidine including but not limited to gynecomastia, headache, diarrhea, nausea, drowsiness, arrhythmias, pancreatitis, skin rashes, psychosis, bone marrow suppression and kidney toxicity. Rifampin Counseling: I discussed with the patient the risks of rifampin including but not limited to liver damage, kidney damage, red-orange body fluids, nausea/vomiting and severe allergy. Minoxidil Counseling: Minoxidil is a topical medication which can increase blood flow where it is applied. It is uncertain how this medication increases hair growth. Side effects are uncommon and include stinging and allergic reactions. Bactrim Counseling:  I discussed with the patient the risks of sulfa antibiotics including but not limited to GI upset, allergic reaction, drug rash, diarrhea, dizziness, photosensitivity, and yeast infections.  Rarely, more serious reactions can occur including but not limited to aplastic anemia, agranulocytosis, methemoglobinemia, blood dyscrasias, liver or kidney failure, lung infiltrates or desquamative/blistering drug rashes. Methotrexate Counseling:  Patient counseled regarding adverse effects of methotrexate including but not limited to nausea, vomiting, abnormalities in liver function tests. Patients may develop mouth sores, rash, diarrhea, and abnormalities in blood counts. The patient understands that monitoring is required including LFT's and blood counts.  There is a rare possibility of scarring of the liver and lung problems that can occur when taking methotrexate. Persistent nausea, loss of appetite, pale stools, dark urine, cough, and shortness of breath should be reported immediately. Patient advised to discontinue methotrexate treatment at least three months before attempting to become pregnant.  I discussed the need for folate supplements while taking methotrexate.  These supplements can decrease side effects during methotrexate treatment. The patient verbalized understanding of the proper use and possible adverse effects of methotrexate.  All of the patient's questions and concerns were addressed. Topical Clindamycin Counseling: Patient counseled that this medication may cause skin irritation or allergic reactions.  In the event of skin irritation, the patient was advised to reduce the amount of the drug applied or use it less frequently.   The patient verbalized understanding of the proper use and possible adverse effects of clindamycin.  All of the patient's questions and concerns were addressed. Niacinamide Pregnancy And Lactation Text: These medications are considered safe during pregnancy. Benzoyl Peroxide Counseling: Patient counseled that medicine may cause skin irritation and bleach clothing.  In the event of skin irritation, the patient was advised to reduce the amount of the drug applied or use it less frequently.   The patient verbalized understanding of the proper use and possible adverse effects of benzoyl peroxide.  All of the patient's questions and concerns were addressed. Rifampin Pregnancy And Lactation Text: This medication is Pregnancy Category C and it isn't know if it is safe during pregnancy. It is also excreted in breast milk and should not be used if you are breast feeding. Hydroxyzine Pregnancy And Lactation Text: This medication is not safe during pregnancy and should not be taken. It is also excreted in breast milk and breast feeding isn't recommended. Bactrim Pregnancy And Lactation Text: This medication is Pregnancy Category D and is known to cause fetal risk.  It is also excreted in breast milk. Humira Counseling:  I discussed with the patient the risks of adalimumab including but not limited to myelosuppression, immunosuppression, autoimmune hepatitis, demyelinating diseases, lymphoma, and serious infections.  The patient understands that monitoring is required including a PPD at baseline and must alert us or the primary physician if symptoms of infection or other concerning signs are noted. Tremfya Counseling: I discussed with the patient the risks of guselkumab including but not limited to immunosuppression, serious infections, worsening of inflammatory bowel disease and drug reactions.  The patient understands that monitoring is required including a PPD at baseline and must alert us or the primary physician if symptoms of infection or other concerning signs are noted. Methotrexate Pregnancy And Lactation Text: This medication is Pregnancy Category X and is known to cause fetal harm. This medication is excreted in breast milk. Topical Clindamycin Pregnancy And Lactation Text: This medication is Pregnancy Category B and is considered safe during pregnancy. It is unknown if it is excreted in breast milk. Valtrex Counseling: I discussed with the patient the risks of valacyclovir including but not limited to kidney damage, nausea, vomiting and severe allergy.  The patient understands that if the infection seems to be worsening or is not improving, they are to call. Benzoyl Peroxide Pregnancy And Lactation Text: This medication is Pregnancy Category C. It is unknown if benzoyl peroxide is excreted in breast milk. Dapsone Counseling: I discussed with the patient the risks of dapsone including but not limited to hemolytic anemia, agranulocytosis, rashes, methemoglobinemia, kidney failure, peripheral neuropathy, headaches, GI upset, and liver toxicity.  Patients who start dapsone require monitoring including baseline LFTs and weekly CBCs for the first month, then every month thereafter.  The patient verbalized understanding of the proper use and possible adverse effects of dapsone.  All of the patient's questions and concerns were addressed. Cephalexin Counseling: I counseled the patient regarding use of cephalexin as an antibiotic for prophylactic and/or therapeutic purposes. Cephalexin (commonly prescribed under brand name Keflex) is a cephalosporin antibiotic which is active against numerous classes of bacteria, including most skin bacteria. Side effects may include nausea, diarrhea, gastrointestinal upset, rash, hives, yeast infections, and in rare cases, hepatitis, kidney disease, seizures, fever, confusion, neurologic symptoms, and others. Patients with severe allergies to penicillin medications are cautioned that there is about a 10% incidence of cross-reactivity with cephalosporins. When possible, patients with penicillin allergies should use alternatives to cephalosporins for antibiotic therapy. Tetracycline Counseling: Patient counseled regarding possible photosensitivity and increased risk for sunburn.  Patient instructed to avoid sunlight, if possible.  When exposed to sunlight, patients should wear protective clothing, sunglasses, and sunscreen.  The patient was instructed to call the office immediately if the following severe adverse effects occur:  hearing changes, easy bruising/bleeding, severe headache, or vision changes.  The patient verbalized understanding of the proper use and possible adverse effects of tetracycline.  All of the patient's questions and concerns were addressed. Patient understands to avoid pregnancy while on therapy due to potential birth defects. Nsaids Counseling: NSAID Counseling: I discussed with the patient that NSAIDs should be taken with food. Prolonged use of NSAIDs can result in the development of stomach ulcers.  Patient advised to stop taking NSAIDs if abdominal pain occurs.  The patient verbalized understanding of the proper use and possible adverse effects of NSAIDs.  All of the patient's questions and concerns were addressed. Topical Sulfur Applications Counseling: Topical Sulfur Counseling: Patient counseled that this medication may cause skin irritation or allergic reactions.  In the event of skin irritation, the patient was advised to reduce the amount of the drug applied or use it less frequently.   The patient verbalized understanding of the proper use and possible adverse effects of topical sulfur application.  All of the patient's questions and concerns were addressed. Doxepin Counseling:  Patient advised that the medication is sedating and not to drive a car after taking this medication. Patient informed of potential adverse effects including but not limited to dry mouth, urinary retention, and blurry vision.  The patient verbalized understanding of the proper use and possible adverse effects of doxepin.  All of the patient's questions and concerns were addressed. Prednisone Counseling:  I discussed with the patient the risks of prolonged use of prednisone including but not limited to weight gain, insomnia, osteoporosis, mood changes, diabetes, susceptibility to infection, glaucoma and high blood pressure.  In cases where prednisone use is prolonged, patients should be monitored with blood pressure checks, serum glucose levels and an eye exam.  Additionally, the patient may need to be placed on GI prophylaxis, PCP prophylaxis, and calcium and vitamin D supplementation and/or a bisphosphonate.  The patient verbalized understanding of the proper use and the possible adverse effects of prednisone.  All of the patient's questions and concerns were addressed. Mirvaso Counseling: Mirvaso is a topical medication which can decrease superficial blood flow where applied. Side effects are uncommon and include stinging, redness and allergic reactions. Carac Counseling:  I discussed with the patient the risks of Carac including but not limited to erythema, scaling, itching, weeping, crusting, and pain. Valtrex Pregnancy And Lactation Text: this medication is Pregnancy Category B and is considered safe during pregnancy. This medication is not directly found in breast milk but it's metabolite acyclovir is present. Albendazole Counseling:  I discussed with the patient the risks of albendazole including but not limited to cytopenia, kidney damage, nausea/vomiting and severe allergy.  The patient understands that this medication is being used in an off-label manner. Dapsone Pregnancy And Lactation Text: This medication is Pregnancy Category C and is not considered safe during pregnancy or breast feeding. Nsaids Pregnancy And Lactation Text: These medications are considered safe up to 30 weeks gestation. It is excreted in breast milk. Mirvaso Pregnancy And Lactation Text: This medication has not been assigned a Pregnancy Risk Category. It is unknown if the medication is excreted in breast milk. Doxepin Pregnancy And Lactation Text: This medication is Pregnancy Category C and it isn't known if it is safe during pregnancy. It is also excreted in breast milk and breast feeding isn't recommended. Cephalexin Pregnancy And Lactation Text: This medication is Pregnancy Category B and considered safe during pregnancy.  It is also excreted in breast milk but can be used safely for shorter doses. Erivedge Counseling- I discussed with the patient the risks of Erivedge including but not limited to nausea, vomiting, diarrhea, constipation, weight loss, changes in the sense of taste, decreased appetite, muscle spasms, and hair loss.  The patient verbalized understanding of the proper use and possible adverse effects of Erivedge.  All of the patient's questions and concerns were addressed. Ilumya Counseling: I discussed with the patient the risks of tildrakizumab including but not limited to immunosuppression, malignancy, posterior leukoencephalopathy syndrome, and serious infections.  The patient understands that monitoring is required including a PPD at baseline and must alert us or the primary physician if symptoms of infection or other concerning signs are noted. Xeljanz Counseling: I discussed with the patient the risks of Xeljanz therapy including increased risk of infection, liver issues, headache, diarrhea, or cold symptoms. Live vaccines should be avoided. They were instructed to call if they have any problems. Topical Sulfur Applications Pregnancy And Lactation Text: This medication is Pregnancy Category C and has an unknown safety profile during pregnancy. It is unknown if this topical medication is excreted in breast milk. Albendazole Pregnancy And Lactation Text: This medication is Pregnancy Category C and it isn't known if it is safe during pregnancy. It is also excreted in breast milk. Carac Pregnancy And Lactation Text: This medication is Pregnancy Category X and contraindicated in pregnancy and in women who may become pregnant. It is unknown if this medication is excreted in breast milk. Use Enhanced Medication Counseling?: No Clindamycin Counseling: I counseled the patient regarding use of clindamycin as an antibiotic for prophylactic and/or therapeutic purposes. Clindamycin is active against numerous classes of bacteria, including skin bacteria. Side effects may include nausea, diarrhea, gastrointestinal upset, rash, hives, yeast infections, and in rare cases, colitis. Odomzo Counseling- I discussed with the patient the risks of Odomzo including but not limited to nausea, vomiting, diarrhea, constipation, weight loss, changes in the sense of taste, decreased appetite, muscle spasms, and hair loss.  The patient verbalized understanding of the proper use and possible adverse effects of Odomzo.  All of the patient's questions and concerns were addressed. Xelroxannz Pregnancy And Lactation Text: This medication is Pregnancy Category D and is not considered safe during pregnancy.  The risk during breast feeding is also uncertain. Wartpeel Counseling:  I discussed with the patient the risks of Wartpeel including but not limited to erythema, scaling, itching, weeping, crusting, and pain. Picato Counseling:  I discussed with the patient the risks of Picato including but not limited to erythema, scaling, itching, weeping, crusting, and pain. 5-Fu Counseling: 5-Fluorouracil Counseling:  I discussed with the patient the risks of 5-fluorouracil including but not limited to erythema, scaling, itching, weeping, crusting, and pain. Ivermectin Counseling:  Patient instructed to take medication on an empty stomach with a full glass of water.  Patient informed of potential adverse effects including but not limited to nausea, diarrhea, dizziness, itching, and swelling of the extremities or lymph nodes.  The patient verbalized understanding of the proper use and possible adverse effects of ivermectin.  All of the patient's questions and concerns were addressed. Acitretin Counseling:  I discussed with the patient the risks of acitretin including but not limited to hair loss, dry lips/skin/eyes, liver damage, hyperlipidemia, depression/suicidal ideation, photosensitivity.  Serious rare side effects can include but are not limited to pancreatitis, pseudotumor cerebri, bony changes, clot formation/stroke/heart attack.  Patient understands that alcohol is contraindicated since it can result in liver toxicity and significantly prolong the elimination of the drug by many years. Clindamycin Pregnancy And Lactation Text: This medication can be used in pregnancy if certain situations. Clindamycin is also present in breast milk. Fluconazole Counseling:  Patient counseled regarding adverse effects of fluconazole including but not limited to headache, diarrhea, nausea, upset stomach, liver function test abnormalities, taste disturbance, and stomach pain.  There is a rare possibility of liver failure that can occur when taking fluconazole.  The patient understands that monitoring of LFTs and kidney function test may be required, especially at baseline. The patient verbalized understanding of the proper use and possible adverse effects of fluconazole.  All of the patient's questions and concerns were addressed. Infliximab Counseling:  I discussed with the patient the risks of infliximab including but not limited to myelosuppression, immunosuppression, autoimmune hepatitis, demyelinating diseases, lymphoma, and serious infections.  The patient understands that monitoring is required including a PPD at baseline and must alert us or the primary physician if symptoms of infection or other concerning signs are noted. Detail Level: Simple Xolair Counseling:  Patient informed of potential adverse effects including but not limited to fever, muscle aches, rash and allergic reactions.  The patient verbalized understanding of the proper use and possible adverse effects of Xolair.  All of the patient's questions and concerns were addressed. Doxycycline Counseling:  Patient counseled regarding possible photosensitivity and increased risk for sunburn.  Patient instructed to avoid sunlight, if possible.  When exposed to sunlight, patients should wear protective clothing, sunglasses, and sunscreen.  The patient was instructed to call the office immediately if the following severe adverse effects occur:  hearing changes, easy bruising/bleeding, severe headache, or vision changes.  The patient verbalized understanding of the proper use and possible adverse effects of doxycycline.  All of the patient's questions and concerns were addressed. Otezla Counseling: The side effects of Otezla were discussed with the patient, including but not limited to worsening or new depression, weight loss, diarrhea, nausea, upper respiratory tract infection, and headache. Patient instructed to call the office should any adverse effect occur.  The patient verbalized understanding of the proper use and possible adverse effects of Otezla.  All the patient's questions and concerns were addressed. Acitretin Pregnancy And Lactation Text: This medication is Pregnancy Category X and should not be given to women who are pregnant or may become pregnant in the future. This medication is excreted in breast milk. Gabapentin Counseling: I discussed with the patient the risks of gabapentin including but not limited to dizziness, somnolence, fatigue and ataxia. Xolair Pregnancy And Lactation Text: This medication is Pregnancy Category B and is considered safe during pregnancy. This medication is excreted in breast milk. Zyclara Counseling:  I discussed with the patient the risks of imiquimod including but not limited to erythema, scaling, itching, weeping, crusting, and pain.  Patient understands that the inflammatory response to imiquimod is variable from person to person and was educated regarded proper titration schedule.  If flu-like symptoms develop, patient knows to discontinue the medication and contact us. Protopic Counseling: Patient may experience a mild burning sensation during topical application. Protopic is not approved in children less than 2 years of age. There have been case reports of hematologic and skin malignancies in patients using topical calcineurin inhibitors although causality is questionable. Drysol Counseling:  I discussed with the patient the risks of drysol/aluminum chloride including but not limited to skin rash, itching, irritation, burning. Bexarotene Counseling:  I discussed with the patient the risks of bexarotene including but not limited to hair loss, dry lips/skin/eyes, liver abnormalities, hyperlipidemia, pancreatitis, depression/suicidal ideation, photosensitivity, drug rash/allergic reactions, hypothyroidism, anemia, leukopenia, infection, cataracts, and teratogenicity.  Patient understands that they will need regular blood tests to check lipid profile, liver function tests, white blood cell count, thyroid function tests and pregnancy test if applicable. Otezla Pregnancy And Lactation Text: This medication is Pregnancy Category C and it isn't known if it is safe during pregnancy. It is unknown if it is excreted in breast milk. Doxycycline Pregnancy And Lactation Text: This medication is Pregnancy Category D and not consider safe during pregnancy. It is also excreted in breast milk but is considered safe for shorter treatment courses. Griseofulvin Counseling:  I discussed with the patient the risks of griseofulvin including but not limited to photosensitivity, cytopenia, liver damage, nausea/vomiting and severe allergy.  The patient understands that this medication is best absorbed when taken with a fatty meal (e.g., ice cream or french fries). Rituxan Counseling:  I discussed with the patient the risks of Rituxan infusions. Side effects can include infusion reactions, severe drug rashes including mucocutaneous reactions, reactivation of latent hepatitis and other infections and rarely progressive multifocal leukoencephalopathy.  All of the patient's questions and concerns were addressed. Drysol Pregnancy And Lactation Text: This medication is considered safe during pregnancy and breast feeding. Oxybutynin Counseling:  I discussed with the patient the risks of oxybutynin including but not limited to skin rash, drowsiness, dry mouth, difficulty urinating, and blurred vision. Erythromycin Counseling:  I discussed with the patient the risks of erythromycin including but not limited to GI upset, allergic reaction, drug rash, diarrhea, increase in liver enzymes, and yeast infections. Griseofulvin Pregnancy And Lactation Text: This medication is Pregnancy Category X and is known to cause serious birth defects. It is unknown if this medication is excreted in breast milk but breast feeding should be avoided. Protopic Pregnancy And Lactation Text: This medication is Pregnancy Category C. It is unknown if this medication is excreted in breast milk when applied topically. Elidel Counseling: Patient may experience a mild burning sensation during topical application. Elidel is not approved in children less than 2 years of age. There have been case reports of hematologic and skin malignancies in patients using topical calcineurin inhibitors although causality is questionable. Azathioprine Counseling:  I discussed with the patient the risks of azathioprine including but not limited to myelosuppression, immunosuppression, hepatotoxicity, lymphoma, and infections.  The patient understands that monitoring is required including baseline LFTs, Creatinine, possible TPMP genotyping and weekly CBCs for the first month and then every 2 weeks thereafter.  The patient verbalized understanding of the proper use and possible adverse effects of azathioprine.  All of the patient's questions and concerns were addressed. Bexarotene Pregnancy And Lactation Text: This medication is Pregnancy Category X and should not be given to women who are pregnant or may become pregnant. This medication should not be used if you are breast feeding. Rituxan Pregnancy And Lactation Text: This medication is Pregnancy Category C and it isn't know if it is safe during pregnancy. It is unknown if this medication is excreted in breast milk but similar antibodies are known to be excreted. Glycopyrrolate Counseling:  I discussed with the patient the risks of glycopyrrolate including but not limited to skin rash, drowsiness, dry mouth, difficulty urinating, and blurred vision. Rhofade Counseling: Rhofade is a topical medication which can decrease superficial blood flow where applied. Side effects are uncommon and include stinging, redness and allergic reactions. Opioid Counseling: I discussed with the patient the potential side effects of opioids including but not limited to addiction, altered mental status, and depression. I stressed avoiding alcohol, benzodiazepines, muscle relaxants and sleep aids unless specifically okayed by a physician. The patient verbalized understanding of the proper use and possible adverse effects of opioids. All of the patient's questions and concerns were addressed. They were instructed to flush the remaining pills down the toilet if they did not need them for pain. Isotretinoin Counseling: Patient should get monthly blood tests, not donate blood, not drive at night if vision affected, not share medication, and not undergo elective surgery for 6 months after tx completed. Side effects reviewed, pt to contact office should one occur. Itraconazole Counseling:  I discussed with the patient the risks of itraconazole including but not limited to liver damage, nausea/vomiting, neuropathy, and severe allergy.  The patient understands that this medication is best absorbed when taken with acidic beverages such as non-diet cola or ginger ale.  The patient understands that monitoring is required including baseline LFTs and repeat LFTs at intervals.  The patient understands that they are to contact us or the primary physician if concerning signs are noted. Glycopyrrolate Pregnancy And Lactation Text: This medication is Pregnancy Category B and is considered safe during pregnancy. It is unknown if it is excreted breast milk. Erythromycin Pregnancy And Lactation Text: This medication is Pregnancy Category B and is considered safe during pregnancy. It is also excreted in breast milk. Siliq Counseling:  I discussed with the patient the risks of Siliq including but not limited to new or worsening depression, suicidal thoughts and behavior, immunosuppression, malignancy, posterior leukoencephalopathy syndrome, and serious infections.  The patient understands that monitoring is required including a PPD at baseline and must alert us or the primary physician if symptoms of infection or other concerning signs are noted. There is also a special program designed to monitor depression which is required with Siliq. Cimzia Counseling:  I discussed with the patient the risks of Cimzia including but not limited to immunosuppression, allergic reactions and infections.  The patient understands that monitoring is required including a PPD at baseline and must alert us or the primary physician if symptoms of infection or other concerning signs are noted. Birth Control Pills Counseling: Birth Control Pill Counseling: I discussed with the patient the potential side effects of OCPs including but not limited to increased risk of stroke, heart attack, thrombophlebitis, deep venous thrombosis, hepatic adenomas, breast changes, GI upset, headaches, and depression.  The patient verbalized understanding of the proper use and possible adverse effects of OCPs. All of the patient's questions and concerns were addressed. Hydroxychloroquine Counseling:  I discussed with the patient that a baseline ophthalmologic exam is needed at the start of therapy and every year thereafter while on therapy. A CBC may also be warranted for monitoring.  The side effects of this medication were discussed with the patient, including but not limited to agranulocytosis, aplastic anemia, seizures, rashes, retinopathy, and liver toxicity. Patient instructed to call the office should any adverse effect occur.  The patient verbalized understanding of the proper use and possible adverse effects of Plaquenil.  All the patient's questions and concerns were addressed. Opioid Pregnancy And Lactation Text: These medications can lead to premature delivery and should be avoided during pregnancy. These medications are also present in breast milk in small amounts. Metronidazole Counseling:  I discussed with the patient the risks of metronidazole including but not limited to seizures, nausea/vomiting, a metallic taste in the mouth, nausea/vomiting and severe allergy. Cellcept Counseling:  I discussed with the patient the risks of mycophenolate mofetil including but not limited to infection/immunosuppression, GI upset, hypokalemia, hypercholesterolemia, bone marrow suppression, lymphoproliferative disorders, malignancy, GI ulceration/bleed/perforation, colitis, interstitial lung disease, kidney failure, progressive multifocal leukoencephalopathy, and birth defects.  The patient understands that monitoring is required including a baseline creatinine and regular CBC testing. In addition, patient must alert us immediately if symptoms of infection or other concerning signs are noted. Isotretinoin Pregnancy And Lactation Text: This medication is Pregnancy Category X and is considered extremely dangerous during pregnancy. It is unknown if it is excreted in breast milk. Eucrisa Counseling: Patient may experience a mild burning sensation during topical application. Eucrisa is not approved in children less than 2 years of age. Cimzia Pregnancy And Lactation Text: This medication crosses the placenta but can be considered safe in certain situations. Cimzia may be excreted in breast milk. Solaraze Counseling:  I discussed with the patient the risks of Solaraze including but not limited to erythema, scaling, itching, weeping, crusting, and pain.

## 2022-07-01 NOTE — ASU PATIENT PROFILE, ADULT - FALL HARM RISK - UNIVERSAL INTERVENTIONS
Bed in lowest position, wheels locked, appropriate side rails in place/Call bell, personal items and telephone in reach/Instruct patient to call for assistance before getting out of bed or chair/Non-slip footwear when patient is out of bed/[place] to call system/Physically safe environment - no spills, clutter or unnecessary equipment/Purposeful Proactive Rounding/Room/bathroom lighting operational, light cord in reach

## 2022-07-04 ENCOUNTER — TRANSCRIPTION ENCOUNTER (OUTPATIENT)
Age: 37
End: 2022-07-04

## 2022-07-05 ENCOUNTER — OUTPATIENT (OUTPATIENT)
Dept: OUTPATIENT SERVICES | Facility: HOSPITAL | Age: 37
LOS: 1 days | Discharge: HOME | End: 2022-07-05
Payer: MEDICAID

## 2022-07-05 ENCOUNTER — APPOINTMENT (OUTPATIENT)
Dept: PLASTIC SURGERY | Facility: HOSPITAL | Age: 37
End: 2022-07-05

## 2022-07-05 ENCOUNTER — RESULT REVIEW (OUTPATIENT)
Age: 37
End: 2022-07-05

## 2022-07-05 ENCOUNTER — APPOINTMENT (OUTPATIENT)
Dept: ORTHOPEDIC SURGERY | Facility: HOSPITAL | Age: 37
End: 2022-07-05

## 2022-07-05 ENCOUNTER — TRANSCRIPTION ENCOUNTER (OUTPATIENT)
Age: 37
End: 2022-07-05

## 2022-07-05 VITALS
WEIGHT: 203.93 LBS | RESPIRATION RATE: 16 BRPM | SYSTOLIC BLOOD PRESSURE: 128 MMHG | OXYGEN SATURATION: 98 % | TEMPERATURE: 98 F | DIASTOLIC BLOOD PRESSURE: 87 MMHG | HEART RATE: 70 BPM | HEIGHT: 65 IN

## 2022-07-05 VITALS
SYSTOLIC BLOOD PRESSURE: 137 MMHG | RESPIRATION RATE: 20 BRPM | OXYGEN SATURATION: 97 % | HEART RATE: 64 BPM | DIASTOLIC BLOOD PRESSURE: 79 MMHG

## 2022-07-05 DIAGNOSIS — Z98.890 OTHER SPECIFIED POSTPROCEDURAL STATES: Chronic | ICD-10-CM

## 2022-07-05 DIAGNOSIS — S51.809A UNSPECIFIED OPEN WOUND OF UNSPECIFIED FOREARM, INITIAL ENCOUNTER: ICD-10-CM

## 2022-07-05 PROCEDURE — 73090 X-RAY EXAM OF FOREARM: CPT | Mod: 26,LT

## 2022-07-05 PROCEDURE — 14301 TIS TRNFR ANY 30.1-60 SQ CM: CPT | Mod: 59

## 2022-07-05 PROCEDURE — 25415 REPAIR RADIUS & ULNA: CPT | Mod: LT

## 2022-07-05 PROCEDURE — 97607 NEG PRS WND THR NDME<=50SQCM: CPT | Mod: 59

## 2022-07-05 PROCEDURE — 15736 MUSCLE-SKIN GRAFT ARM: CPT

## 2022-07-05 DEVICE — SCREW LOKG 2.7 X 18
Type: IMPLANTABLE DEVICE | Site: LEFT | Status: NON-FUNCTIONAL
Removed: 2022-07-05

## 2022-07-05 DEVICE — SCREW LOKG 2.7X20MM
Type: IMPLANTABLE DEVICE | Site: LEFT | Status: NON-FUNCTIONAL
Removed: 2022-07-05

## 2022-07-05 DEVICE — SCREW LOKG 2.7X16MM
Type: IMPLANTABLE DEVICE | Site: LEFT | Status: NON-FUNCTIONAL
Removed: 2022-07-05

## 2022-07-05 DEVICE — SCREW CORT 2.7X22MM
Type: IMPLANTABLE DEVICE | Site: LEFT | Status: NON-FUNCTIONAL
Removed: 2022-07-05

## 2022-07-05 DEVICE — IMPLANTABLE DEVICE
Type: IMPLANTABLE DEVICE | Site: LEFT | Status: NON-FUNCTIONAL
Removed: 2022-07-05

## 2022-07-05 DEVICE — ARISTA 3GR
Type: IMPLANTABLE DEVICE | Site: LEFT | Status: NON-FUNCTIONAL
Removed: 2022-07-05

## 2022-07-05 RX ORDER — ACETAMINOPHEN 500 MG
2 TABLET ORAL
Qty: 80 | Refills: 0
Start: 2022-07-05 | End: 2022-07-14

## 2022-07-05 RX ORDER — ONDANSETRON 8 MG/1
4 TABLET, FILM COATED ORAL ONCE
Refills: 0 | Status: DISCONTINUED | OUTPATIENT
Start: 2022-07-05 | End: 2022-07-19

## 2022-07-05 RX ORDER — FENTANYL CITRATE 50 UG/ML
25 INJECTION INTRAVENOUS
Refills: 0 | Status: DISCONTINUED | OUTPATIENT
Start: 2022-07-05 | End: 2022-07-05

## 2022-07-05 RX ORDER — HYDROMORPHONE HYDROCHLORIDE 2 MG/ML
0.5 INJECTION INTRAMUSCULAR; INTRAVENOUS; SUBCUTANEOUS
Refills: 0 | Status: DISCONTINUED | OUTPATIENT
Start: 2022-07-05 | End: 2022-07-05

## 2022-07-05 RX ORDER — IBUPROFEN 200 MG
1 TABLET ORAL
Qty: 40 | Refills: 0
Start: 2022-07-05 | End: 2022-07-14

## 2022-07-05 RX ADMIN — HYDROMORPHONE HYDROCHLORIDE 0.5 MILLIGRAM(S): 2 INJECTION INTRAMUSCULAR; INTRAVENOUS; SUBCUTANEOUS at 12:48

## 2022-07-05 NOTE — ASU DISCHARGE PLAN (ADULT/PEDIATRIC) - CARE PROVIDER_API CALL
Corina Feng; MPH)  Orthopaedic Surgery  [address], [address]  [place] [address]  Phone: [phone]  Fax: [phone]  Follow Up Time:     Carmelo Kumar)  ColonRectal Surgery; Plastic Surgery; Surgery; Surgery of the Hand  [address], [address]  [place] [address]  Phone: [phone]  Fax: [phone]  Follow Up Time:

## 2022-07-05 NOTE — BRIEF OPERATIVE NOTE - NSICDXBRIEFPROCEDURE_GEN_ALL_CORE_FT
PROCEDURES:  Adjacent tissue transfer of arm, defect size 15.1 sq cm to 30 sq cm 05-Jul-2022 12:30:30  Basim Jolley

## 2022-07-05 NOTE — BRIEF OPERATIVE NOTE - OPERATION/FINDINGS
Following removal of hardware, debridement, and revision of ORIF of left radius, reconstruction of left forearm with local flap and wound VAC placement.

## 2022-07-05 NOTE — ASU DISCHARGE PLAN (ADULT/PEDIATRIC) - NS MD DC FALL RISK RISK
For information on Fall & Injury Prevention, visit: [email or web address] OR  [email or web address] OR  https://www.cdc.gov/steadi/patient.html

## 2022-07-05 NOTE — ASU DISCHARGE PLAN (ADULT/PEDIATRIC) - PROCEDURE
removal of hardware, debridement, and revision of ORIF of left radius, reconstruction of left forearm with local flap and wound VAC placement

## 2022-07-05 NOTE — ASU DISCHARGE PLAN (ADULT/PEDIATRIC) - ASU DC SPECIAL INSTRUCTIONSFT
Please keep your dressing clean, dry, and in place until follow up.    You have a portable wound vacuum device on your arm. This is called the Prevena Plus. You should make sure to charge the Prevena Plus when you are sitting in a chair or in bed.    Please keep your left upper extremity elevated while sitting or in bed.    Take medications as prescribed.    Please follow up with Dr. Kumar 1 week after discharge from the hospital. You may call [phone] to schedule an appointment.    Please follow up with Dr. Feng within x1-2 weeks after discharge from the hospital. You may call [phone] to schedule an appointment. Please keep your dressing clean, dry, and in place until follow up.    You have a portable wound vacuum device on your arm. This is called the Prevena Plus. You should make sure to charge the Prevena Plus when you are sitting in a chair or in bed.    Please keep your left upper extremity elevated while sitting or in bed. Keep splint in place do not remove. You may remove sling as tolerated. Regularly move fingers to prevent stiffness.    Take medications as prescribed. Take doxycyline as prescribed to prevent infection    Please follow up with Dr. Kumar 1 week after discharge from the hospital. You may call [phone] to schedule an appointment.    Please follow up with Dr. Feng within x1-2 weeks after discharge from the hospital. You may call [phone] to schedule an appointment.

## 2022-07-06 ENCOUNTER — NON-APPOINTMENT (OUTPATIENT)
Age: 37
End: 2022-07-06

## 2022-07-06 RX ORDER — DOXYCYCLINE HYCLATE 100 MG/1
100 CAPSULE ORAL
Qty: 14 | Refills: 0 | Status: ACTIVE | COMMUNITY
Start: 2022-07-06 | End: 1900-01-01

## 2022-07-09 ENCOUNTER — EMERGENCY (EMERGENCY)
Facility: HOSPITAL | Age: 37
LOS: 1 days | Discharge: HOME | End: 2022-07-09
Attending: EMERGENCY MEDICINE | Admitting: EMERGENCY MEDICINE
Payer: COMMERCIAL

## 2022-07-09 VITALS
RESPIRATION RATE: 16 BRPM | HEART RATE: 71 BPM | HEIGHT: 65 IN | SYSTOLIC BLOOD PRESSURE: 141 MMHG | WEIGHT: 203.93 LBS | OXYGEN SATURATION: 96 % | DIASTOLIC BLOOD PRESSURE: 83 MMHG | TEMPERATURE: 99 F

## 2022-07-09 VITALS
OXYGEN SATURATION: 98 % | HEART RATE: 78 BPM | SYSTOLIC BLOOD PRESSURE: 125 MMHG | TEMPERATURE: 98 F | RESPIRATION RATE: 17 BRPM | DIASTOLIC BLOOD PRESSURE: 81 MMHG

## 2022-07-09 DIAGNOSIS — Z98.890 OTHER SPECIFIED POSTPROCEDURAL STATES: Chronic | ICD-10-CM

## 2022-07-09 PROCEDURE — 99282 EMERGENCY DEPT VISIT SF MDM: CPT

## 2022-07-09 PROCEDURE — 99283 EMERGENCY DEPT VISIT LOW MDM: CPT

## 2022-07-09 NOTE — ED ADULT NURSE NOTE - CHIEF COMPLAINT QUOTE
Patient arrives alert and oriented left arm complications. Patient reports that on Tuesday he had bone graft for reconstruction of left forearm at [facility]. The vaccuum was working yesterday to suck up fluid and today fluid leaked out of cast. Patient states he wanted to know if cast could be removed, vaccuum could be stopped and arm cleaned up.

## 2022-07-09 NOTE — ED PROVIDER NOTE - PATIENT PORTAL LINK FT
You can access the FollowMyHealth Patient Portal offered by [facility] by registering at the following website: [email or web address]. By joining [email or web address]’s FollowMyHealth portal, you will also be able to view your health information using other applications (apps) compatible with our system.

## 2022-07-09 NOTE — ED ADULT TRIAGE NOTE - CHIEF COMPLAINT QUOTE
Patient arrives alert and oriented left arm complications. Patient reports that on Tuesday he had bone graft for reconstruction of left forearm at [facility]. The vaccuum was working yesterday to suck up fluid and today fluid leaked out of cast. Patient states he wanted to know if cast could be removed, vaccuum could be stopped and arm cleaned up.

## 2022-07-09 NOTE — ED ADULT NURSE NOTE - OBJECTIVE STATEMENT
Pt. received alert and oriented x3 with chief complaint of left forearm drainage post surgery 4 days ago.

## 2022-07-09 NOTE — ED ADULT NURSE NOTE - NSIMPLEMENTINTERV_GEN_ALL_ED
Implemented All Universal Safety Interventions:  [place] to call system. Call bell, personal items and telephone within reach. Instruct patient to call for assistance. Room bathroom lighting operational. Non-slip footwear when patient is off stretcher. Physically safe environment: no spills, clutter or unnecessary equipment. Stretcher in lowest position, wheels locked, appropriate side rails in place.

## 2022-07-09 NOTE — ED PROVIDER NOTE - NSFOLLOWUPINSTRUCTIONS_ED_ALL_ED_FT
Wound Infection      A wound infection happens when tiny organisms (microorganisms) start to grow in a wound. A wound infection is most often caused by bacteria. Infection can cause the wound to break open or worsen. Wound infection needs treatment. If a wound infection is left untreated, complications can occur. Untreated wound infections may lead to an infection in the bloodstream (septicemia) or a bone infection (osteomyelitis).      What are the causes?    This condition is most often caused by bacteria growing in a wound. Other microorganisms, like yeast and fungi, can also cause wound infections.      What increases the risk?    The following factors may make you more likely to develop this condition:  •Having a weak body defense system (immune system).      •Having diabetes.      •Taking steroid medicines for a long time (chronic use).      •Smoking.      •Being an older person.      •Being overweight.      •Taking chemotherapy medicines.        What are the signs or symptoms?    Symptoms of this condition include:  •Having more redness, swelling, or pain at the wound site.      •Having more blood or fluid at the wound site.      •A bad smell coming from a wound or bandage (dressing).      •Having a fever.      •Feeling tired or fatigued.      •Having warmth at or around the wound.      •Having pus at the wound site.        How is this diagnosed?    This condition is diagnosed with a medical history and physical exam. You may also have a wound culture or blood tests or both.      How is this treated?    This condition is usually treated with an antibiotic medicine.  •The infection should improve 24–48 hours after you start antibiotics.      •After 24–48 hours, redness around the wound should stop spreading, and the wound should be less painful.        Follow these instructions at home:    Medicines     •Take or apply over-the-counter and prescription medicines only as told by your health care provider.      •If you were prescribed an antibiotic medicine, take or apply it as told by your health care provider. Do not stop using the antibiotic even if you start to feel better.        Wound care    •Clean the wound each day, or as told by your health care provider.  •Wash the wound with mild soap and water.      •Rinse the wound with water to remove all soap.      •Pat the wound dry with a clean towel. Do not rub it.      •Follow instructions from your health care provider about how to take care of your wound. Make sure you:  •Wash your hands with soap and water before and after you change your dressing. If soap and water are not available, use hand .      •Change your dressing as told by your health care provider.      •Leave stitches (sutures), skin glue, or adhesive strips in place if your wound has been closed. These skin closures may need to stay in place for 2 weeks or longer. If adhesive strip edges start to loosen and curl up, you may trim the loose edges. Do not remove adhesive strips completely unless your health care provider tells you to do that. Some wounds are left open to heal on their own.      •Check your wound every day for signs of infection. Watch for:  •More redness, swelling, or pain.      •More fluid or blood.      •Warmth.      •Pus or a bad smell.        General instructions     •Keep the dressing dry until your health care provider says it can be removed.      • Do not take baths, swim, or use a hot tub until your health care provider approves. Ask your health care provider if you may take showers. You may only be allowed to take sponge baths.      •Raise (elevate) the injured area above the level of your heart while you are sitting or lying down.      • Do not scratch or pick at the wound.      •Keep all follow-up visits as told by your health care provider. This is important.        Contact a health care provider if:    •Your pain is not controlled with medicine.      •You have more redness, swelling, or pain around your wound.      •You have more fluid or blood coming from your wound.      •Your wound feels warm to the touch.      •You have pus coming from your wound.      •You continue to notice a bad smell coming from your wound or your dressing.      •Your wound that was closed breaks open.        Get help right away if:    •You have a red streak going away from your wound.      •You have a fever.        Summary    •A wound infection happens when tiny organisms (microorganisms) start to grow in a wound.      •This condition is usually treated with an antibiotic medicine.      •Follow instructions from your health care provider about how to take care of your wound.      •Contact a health care provider if your wound infection does not begin to improve in 24–48 hours, or your symptoms worsen.      •Keep all follow-up visits as told by your health care provider. This is important.      This information is not intended to replace advice given to you by your health care provider. Make sure you discuss any questions you have with your health care provider.

## 2022-07-09 NOTE — ED PROVIDER NOTE - OBJECTIVE STATEMENT
36yo male with drainage from wound, pt had skin graft surgery tuesday and noticed today it was draining, no pain no fever, chills. pt is on abx

## 2022-07-09 NOTE — ED PROVIDER NOTE - CLINICAL SUMMARY MEDICAL DECISION MAKING FREE TEXT BOX
38yo male eith drainage from wound vac, advised to go to [facility] and follow up with his surgeon, dressing changes

## 2022-07-10 ENCOUNTER — EMERGENCY (EMERGENCY)
Facility: HOSPITAL | Age: 37
LOS: 1 days | Discharge: HOME | End: 2022-07-10
Admitting: EMERGENCY MEDICINE

## 2022-07-10 VITALS
TEMPERATURE: 98 F | SYSTOLIC BLOOD PRESSURE: 131 MMHG | HEART RATE: 96 BPM | DIASTOLIC BLOOD PRESSURE: 72 MMHG | RESPIRATION RATE: 16 BRPM | HEIGHT: 65 IN | OXYGEN SATURATION: 100 %

## 2022-07-10 DIAGNOSIS — Z98.890 OTHER SPECIFIED POSTPROCEDURAL STATES: Chronic | ICD-10-CM

## 2022-07-10 PROCEDURE — 99284 EMERGENCY DEPT VISIT MOD MDM: CPT

## 2022-07-10 NOTE — ED PROVIDER NOTE - OBJECTIVE STATEMENT
37 y.o. male with recent L radial infection, s/p Left radial shaft removal of hardware, debridement of infected intercalary graft, and revision of open reduction and internal fixation on 7/5/22 presents to the ED with c/o increased drainage from top incision site and intermittently malfunctioning VAC.  He reports that copious drainage began yesterday, soaked through all layers of dressing, and was "dripping down his fingers."  Color of drainage was serosanguinous like in his VAC.  VAC has also been not working sometimes.   He was seen at [facility] [facility] and referred back to surgeon.  Pt states he spoke with Dr. Kumar yesterday and was advised to come into [facility] ED to be evaluated by Dr. Kumar's fellow/resident.  He denies any fever, chills, nausea, vomiting, severe pain, or local signs of infection.

## 2022-07-10 NOTE — ED PROVIDER NOTE - NSFOLLOWUPINSTRUCTIONS_ED_ALL_ED_FT
REST, NO STRENUOUS ACTIVITY  CHANGE DRESSING AS DIRECTED BY PLASTIC SURGERY TEAM  **PLEASE FOLLOW UP WITH DR. RUBIO THIS WEEK AS SCHEDULED**  RETURN TO ER FOR WORSENING SYMPTOMS            Wound Care, Adult      Taking care of your wound properly can help to prevent pain, infection, and scarring. It can also help your wound heal more quickly. Follow instructions from your health care provider about how to care for your wound.      Supplies needed:    •Soap and water.  •Wound cleanser.  •Gauze.  •If needed, a clean bandage (dressing) or other type of wound dressing material to cover or place in the wound. Follow your health care provider's instructions about what dressing supplies to use.  •Cream or ointment to apply to the wound, if told by your health care provider.    How to care for your wound    Cleaning the wound     Ask your health care provider how to clean the wound. This may include:  •Using mild soap and water or a wound cleanser.  •Using a clean gauze to pat the wound dry after cleaning it. Do not rub or scrub the wound.      Dressing care    •Wash your hands with soap and water for at least 20 seconds before and after you change the dressing. If soap and water are not available, use hand .  •Change your dressing as told by your health care provider. This may include:  •Cleaning or rinsing out (irrigating) the wound.  •Placing a dressing over the wound or in the wound (packing).  •Covering the wound with an outer dressing.  •Leave any stitches (sutures), skin glue, or adhesive strips in place. These skin closures may need to stay in place for 2 weeks or longer. If adhesive strip edges start to loosen and curl up, you may trim the loose edges. Do not remove adhesive strips completely unless your health care provider tells you to do that.  •Ask your health care provider when you can leave the wound uncovered.    Checking for infection    Check your wound area every day for signs of infection. Check for:  •More redness, swelling, or pain.  •Fluid or blood.  •Warmth.  •Pus or a bad smell.      Follow these instructions at home    Medicines     •If you were prescribed an antibiotic medicine, cream, or ointment, take or apply it as told by your health care provider. Do not stop using the antibiotic even if your condition improves  •If you were prescribed pain medicine, take it 30 minutes before you do any wound care or as told by your health care provider.  •Take over-the-counter and prescription medicines only as told by your health care provider.      Eating and drinking   •Eat a diet that includes protein, vitamin A, vitamin C, and other nutrient-rich foods to help the wound heal.  •Foods rich in protein include meat, fish, eggs, dairy, beans, and nuts.  •Foods rich in vitamin A include carrots and dark green, leafy vegetables.  •Foods rich in vitamin C include citrus fruits, tomatoes, broccoli, and peppers  •Drink enough fluid to keep your urine pale yellow.    General instructions     • Do not take baths, swim, use a hot tub, or do anything that would put the wound underwater until your health care provider approves. Ask your health care provider if you may take showers. You may only be allowed to take sponge baths.  • Do not scratch or pick at the wound. Keep it covered as told by your health care provider.  •Return to your normal activities as told by your health care provider. Ask your health care provider what activities are safe for you.  •Protect your wound from the sun when you are outside for the first 6 months, or for as long as told by your health care provider. Cover up the scar area or apply sunscreen that has an SPF of at least 30.  • Do not use any products that contain nicotine or tobacco, such as cigarettes, e-cigarettes, and chewing tobacco. These may delay wound healing. If you need help quitting, ask your health care provider.  •Keep all follow-up visits as told by your health care provider. This is important.    Contact a health care provider if:    •You received a tetanus shot and you have swelling, severe pain, redness, or bleeding at the injection site.  •Your pain is not controlled with medicine.  •You have any of these signs of infection:  •More redness, swelling, or pain around the wound.  •Fluid or blood coming from the wound.  •Warmth coming from the wound.  •Pus or a bad smell coming from the wound  •A fever or chills.  •You are nauseous or you vomit.  •You are dizzy      Get help right away if:    •You have a red streak of skin near the area around your wound.  •Your wound has been closed with staples, sutures, skin glue, or adhesive strips and it begins to open up and separate.  •Your wound is bleeding, and the bleeding does not stop with gentle pressure.  •You have a rash.  •You faint.  •You have trouble breathing.      These symptoms may represent a serious problem that is an emergency. Do not wait to see if the symptoms will go away. Get medical help right away. Call your local emergency services (911 in the [place] Do not drive yourself to the hospital.       Summary    •Always wash your hands with soap and water for at least 20 seconds before and after changing your dressing.  •Change your dressing as told by your health care provider.  •To help with healing, eat foods that are rich in protein, vitamin A, vitamin C, and other nutrients.  •Check your wound every day for signs of infection. Contact your health care provider if you suspect that your wound is infected.

## 2022-07-10 NOTE — ED ADULT TRIAGE NOTE - HEART RATE (BEATS/MIN)
[facility] - Med Surg 3  Cardiology  Consult Note    Patient Name: Stella Worthy  MRN: [id number]  Admission Date: 2/27/2022  Hospital Length of Stay: 0 days  Code Status: No Order   Attending Provider: Brent Spencer, *   Consulting Provider: Rafy Wayne Md, MD  Primary Care Physician: Dimitri Adhikari MD  Principal Problem:Palpitation    Patient information was obtained from patient, past medical records, ER records and primary team.     Inpatient consult to Cardiology  Consult performed by: Rafy Wayne MD  Consult ordered by: Tomas Jimenez MD  Reason for consult: Palpitations/SVT        Subjective:     Chief Complaint:  Palpitations      HPI:   Ms. Worthy is a 73-year-old  female with PMH significant for pulmonary hypertension, NIDDM, HTN, hyperlipidemia, was in her usual state of health until earlier today.  She started complaining of palpitations, presented to an nearest urgent care facility.  She was noted to be tachycardic initially, however quickly converted and has remained in sinus rhythm.  She complained of palpitations, but denied chest pain or shortness of breath.  She was sent to the ED for further evaluation.  Initial troponin 0.013.  Repeat troponin increased to 0.054.  EKG NSR, with mild bradycardia.  BNP 28.  Rest of the laboratory workup is unremarkable.  Patient continues to deny chest pain or shortness of breath.  Felt palpitations earlier, but not now.  ED physician discussed case with Dr. Mendez cardiology on-call, recommended placing in observation and cardiac monitoring.  Admitting diagnosis:  Elevated troponin.  Palpitations.    Cardiology consulted for palpitations/possible SVT. Pt has converted to NSR/sinus noble. Had nuclear stress with Dr. Hancock last year was neg. ECHO today overall normal BI V function, mild pulm HTN. Discussed stable for DC and follow up with Dr. Hancock with Holter/Bardy monitor and EP eval.     Results for orders placed during the hospital encounter  of 02/27/22    Echo    Interpretation Summary  · The left ventricle is normal in size with concentric remodeling and normal systolic function.  · The estimated ejection fraction is 65%.  · Normal left ventricular diastolic function.  · Normal right ventricular size with normal right ventricular systolic function.  · Mild tricuspid regurgitation.  · Normal central venous pressure (3 mmHg).  · The estimated PA systolic pressure is 49 mmHg.  · There is pulmonary hypertension.        History reviewed. No pertinent past medical history.    Past Surgical History:   Procedure Laterality Date    CHOLECYSTECTOMY      ovaries  removed Bilateral        Review of patient's allergies indicates:   Allergen Reactions    Nitrofurantoin monohyd/m-cryst Nausea And Vomiting    Penicillins      Patient doesn't know if she remember correctly mom told her she was.  Patient doesn't know if she remember correctly mom told her she was.    Other reaction(s): Unknown       No current facility-administered medications on file prior to encounter.     Current Outpatient Medications on File Prior to Encounter   Medication Sig    aspirin (ECOTRIN) 81 MG EC tablet Take 81 mg by mouth once daily.    furosemide (LASIX) 40 MG tablet Take 40 mg by mouth once daily.     gabapentin (NEURONTIN) 300 MG capsule Take 1 capsule (300 mg total) by mouth 2 (two) times daily.    ibuprofen (ADVIL,MOTRIN) 200 MG tablet Take 400 mg by mouth every 6 (six) hours as needed for Pain.    losartan (COZAAR) 50 MG tablet Take 50 mg by mouth once daily.    omeprazole (PRILOSEC) 20 MG capsule Take 1 capsule by mouth once daily    potassium chloride SA (K-DUR,KLOR-CON) 20 MEQ tablet Take 20 mEq by mouth once daily.    simvastatin (ZOCOR) 20 MG tablet TAKE 1 TABLET BY MOUTH ONCE DAILY    vitamin D 1000 units Tab Take 50 Units by mouth once daily.    HYDROcodone-acetaminophen (NORCO) 5-325 mg per tablet Take 1 tablet by mouth every 8 (eight) hours as needed for  Pain. (Patient not taking: Reported on 1/12/2022)    metFORMIN (GLUCOPHAGE) 500 MG tablet Take 1 tablet (500 mg total) by mouth 2 (two) times daily with meals.    omega 3-dha-epa-fish oil 1,000 mg (120 mg-180 mg) Cap Take by mouth.     Family History       Problem Relation (Age of Onset)    Arthritis Sister    Cancer Mother, Father, Brother, Daughter          Tobacco Use    Smoking status: Never Smoker    Smokeless tobacco: Never Used   Substance and Sexual Activity    Alcohol use: No    Drug use: No    Sexual activity: Never     Review of Systems   Constitutional: Positive for malaise/fatigue.   HENT: Negative.     Eyes: Negative.    Cardiovascular:  Positive for dyspnea on exertion, irregular heartbeat and palpitations.   Respiratory:  Positive for shortness of breath.    Endocrine: Negative.    Hematologic/Lymphatic: Negative.    Skin: Negative.    Musculoskeletal: Negative.    Gastrointestinal: Negative.    Genitourinary: Negative.    Neurological: Negative.    Psychiatric/Behavioral: Negative.     Allergic/Immunologic: Negative.    Objective:     Vital Signs (Most Recent):  Temp: 97.7 °F (36.5 °C) (02/28/22 0748)  Pulse: (!) 53 (02/28/22 0748)  Resp: 17 (02/28/22 0748)  BP: (!) 142/65 (02/28/22 0748)  SpO2: 100 % (02/28/22 0748)   Vital Signs (24h Range):  Temp:  [97.6 °F (36.4 °C)-98.5 °F (36.9 °C)] 97.7 °F (36.5 °C)  Pulse:  [48-74] 53  Resp:  [12-20] 17  SpO2:  [97 %-100 %] 100 %  BP: (114-168)/(58-72) 142/65     Weight: 105.7 kg (233 lb)  Body mass index is 41.27 kg/m².    SpO2: 100 %  O2 Device (Oxygen Therapy): room air      Intake/Output Summary (Last 24 hours) at 2/28/2022 1425  Last data filed at 2/28/2022 1200  Gross per 24 hour   Intake 240 ml   Output --   Net 240 ml       Lines/Drains/Airways       Peripheral Intravenous Line  Duration                  Peripheral IV - Single Lumen 02/27/22 1745 20 G Left Wrist <1 day                    Physical Exam  Vitals and nursing note reviewed.    Constitutional:       General: She is not in acute distress.     Appearance: She is well-developed. She is obese. She is not diaphoretic.   HENT:      Head: Normocephalic and atraumatic.      Nose: Nose normal.      Mouth/Throat:      Pharynx: No oropharyngeal exudate.   Eyes:      General: No scleral icterus.        Right eye: No discharge.         Left eye: No discharge.      Conjunctiva/sclera: Conjunctivae normal.   Neck:      Thyroid: No thyromegaly.      Vascular: No JVD.   Cardiovascular:      Rate and Rhythm: Normal rate and regular rhythm.      Heart sounds: S1 normal and S2 normal. No murmur heard.    No friction rub. No gallop. No S3 or S4 sounds.   Pulmonary:      Effort: Pulmonary effort is normal. No respiratory distress.      Breath sounds: Normal breath sounds. No stridor. No wheezing or rales.   Chest:      Chest wall: No tenderness.   Abdominal:      General: Bowel sounds are normal. There is no distension.      Palpations: Abdomen is soft. There is no mass.      Tenderness: There is no abdominal tenderness. There is no rebound.   Genitourinary:     Comments: Deferred  Musculoskeletal:         General: No tenderness or deformity. Normal range of motion.      Cervical back: Normal range of motion and neck supple.   Lymphadenopathy:      Cervical: No cervical adenopathy.   Skin:     General: Skin is warm and dry.      Coloration: Skin is not pale.      Findings: No erythema or rash.   Neurological:      Mental Status: She is alert and oriented to person, place, and time.      Motor: No abnormal muscle tone.      Coordination: Coordination normal.   Psychiatric:         Behavior: Behavior normal.         Thought Content: Thought content normal.         Judgment: Judgment normal.       Significant Labs: All pertinent lab results from the last 24 hours have been reviewed.    Significant Imaging: Echocardiogram: Transthoracic echo (TTE) complete ([facility] Only):   Results for orders placed or performed  during the hospital encounter of 02/27/22   Echo   Result Value Ref Range    BSA 2.17 m2    TDI SEPTAL 0.08 m/s    LV LATERAL E/E' RATIO 9.70 m/s    LV SEPTAL E/E' RATIO 12.13 m/s    LA WIDTH 5.43 cm    IVC diameter 2.24 cm    Left Ventricular Outflow Tract Mean Velocity 0.90842216419378 cm/s    Left Ventricular Outflow Tract Mean Gradient 2.10 mmHg    TDI LATERAL 0.10 m/s    LVIDd 3.18 (A) 3.5 - 6.0 cm    IVS 1.57 (A) 0.6 - 1.1 cm    Posterior Wall 1.45 (A) 0.6 - 1.1 cm    Ao root annulus 3.09 cm    LVIDs 2.19 2.1 - 4.0 cm    FS 31 28 - 44 %    LA volume 53.74 cm3    Sinus 3.15 cm    STJ 2.98 cm    Ascending aorta 3.04 cm    LV mass 172.13 g    LA size 2.89 cm    TAPSE 2.14 cm    Left Ventricle Relative Wall Thickness 0.91 cm    AV mean gradient 4 mmHg    AV valve area 2.42 cm2    AV Velocity Ratio 0.78     AV index (prosthetic) 0.73     MV valve area p 1/2 method 2.75 cm2    E/A ratio 0.96     Mean e' 0.09 m/s    E wave deceleration time 275.833861548738027 msec    IVRT 111.877208738413137 msec    LVOT diameter 2.05 cm    LVOT area 3.3 cm2    LVOT peak david 1.05 m/s    LVOT peak VTI 26.10 cm    Ao peak david 1.35 m/s    Ao VTI 35.6 cm    RVOT peak david 0.73 m/s    RVOT peak VTI 17.4 cm    LVOT stroke volume 86.10 cm3    AV peak gradient 7 mmHg    PV mean gradient 1.23 mmHg    E/E' ratio 10.78 m/s    MV Peak E David 0.97 m/s    TR Max David 3.40 m/s    MV stenosis pressure 1/2 time 79.506386599669755 ms    MV Peak A David 1.01 m/s    LV Systolic Volume 16.11 mL    LV Systolic Volume Index 7.8 mL/m2    LV Diastolic Volume 40.47 mL    LV Diastolic Volume Index 19.65 mL/m2    LA Volume Index 26.1 mL/m2    LV Mass Index 84 g/m2    Echo EF Estimated 60 %    RA Major Axis 4.33 cm    Left Atrium Minor Axis 3.56 cm    Left Atrium Major Axis 4.64 cm    Triscuspid Valve Regurgitation Peak Gradient 46 mmHg    RA Width 3.58 cm    Right Atrial Pressure (from IVC) 3 mmHg    EF 65 %    TV rest pulmonary artery pressure 49 mmHg    Narrative     · The left ventricle is normal in size with concentric remodeling and   normal systolic function.  · The estimated ejection fraction is 65%.  · Normal left ventricular diastolic function.  · Normal right ventricular size with normal right ventricular systolic   function.  · Mild tricuspid regurgitation.  · Normal central venous pressure (3 mmHg).  · The estimated PA systolic pressure is 49 mmHg.  · There is pulmonary hypertension.        Assessment and Plan:     * Palpitation  Likely sec to SVT which converted  Rec outpt Holter/Event monitor and f/u with EP to discuss ablation if needed  Not on BB or AV glenis agents due to bradycardia     Elevated troponin  Sec to demand ischemia     Pulmonary hypertension  F/u outpt pulm  May need RHC    Essential hypertension  Stable, titrate meds     Hypothyroidism  Cont tx per primary team       Follow up with Dr. Hancock     VTE Risk Mitigation (From admission, onward)         Ordered     Place sequential compression device  Until discontinued         02/27/22 5968                Thank you for your consult. I will sign off. Please contact us if you have any additional questions.    Rafy Wayne Md, MD  Cardiology   [facility] - Med S[facility] 3     96

## 2022-07-10 NOTE — ED PROVIDER NOTE - CLINICAL SUMMARY MEDICAL DECISION MAKING FREE TEXT BOX
36 y/o male s/p left forearm surgery (plate removal/revision, wound debridement and flap w/ VAC)  -plastic surgery to evaluate  -seen by plastics - no wound infection, changed dressing  -follow up with dr syed shaffer on tuesday

## 2022-07-10 NOTE — CONSULT NOTE ADULT - ASSESSMENT
ASSESSMENT/PLAN:   JANNET CHRISTY is a 37y Male s/p left forearm ORIF revision, local flap, and wound vac placement 7/5, now presenting with increased drainage dorsally. Does not appear infected.    - Vac modified to not include dorsal part of wound  - Bacitracin, xerforom, gauze, tape to dorsal wound daily  - Continue prevena vac. If significant leak, clamp vac and leave off the machine until follow up appointment  - Continue abx  - Follow up with Dr. Kumar in clinic on Tuesday  - Clear for dc from PRS perspective  - Discussed with Dr. Kumar  - Pt agreeable with plan    Plastic Surgery   [facility]: [id number]

## 2022-07-10 NOTE — CONSULT NOTE ADULT - SUBJECTIVE AND OBJECTIVE BOX
Plastic Surgery Consult Note  (pg [facility]: [id number], [facility]: [phone])    HPI: 37M with extensive history of surgery to left forearm, most recently revision of ORIF with local flap and vac placement 7/5. Presents to ED with increased foul smelling ss discharge from dorsal forearm wound. Denies fever/chills, no other changes. Intermittent vac beeping but otherwise maintaining suction.    PAST MEDICAL & SURGICAL HISTORY:  Anemia      History of heroin abuse  last use 2018      Lumbar herniated disc      Chronic osteomyelitis of right ulna      Fracture of radius      Status post debridement  left arm, placement of external fixator 04/2021   I&amp;D left forearm wound, antibiotic spacer placement re-application of external fixator, groin flap 5/25/21      H/O wrist surgery  left groin flat division and closure, LT radius ORIF with iliac crest bone graft June 2021        Allergies    No Known Allergies    Intolerances      Home Medications:    MEDICATIONS  (STANDING):      SOCIAL HISTORY:  FAMILY HISTORY:      ___________________________________________  OBJECTIVE:  Vital Signs Last 24 Hrs  T(C): 36.9 (10 Jul 2022 10:49), Max: 37.2 (09 Jul 2022 19:04)  T(F): 98.4 (10 Jul 2022 10:49), Max: 99 (09 Jul 2022 19:04)  HR: 96 (10 Jul 2022 10:49) (71 - 96)  BP: 131/72 (10 Jul 2022 10:49) (125/81 - 141/83)  BP(mean): --  RR: 16 (10 Jul 2022 10:49) (16 - 17)  SpO2: 100% (10 Jul 2022 10:49) (96% - 100%)    Parameters below as of 10 Jul 2022 10:49  Patient On (Oxygen Delivery Method): room air    CAPILLARY BLOOD GLUCOSE        I&O's Detail    General: Well developed, well nourished, NAD  Neuro: Alert and oriented, no focal deficits, moves all extremities spontaneously  HEENT: NCAT, EOMI, anicteric, mucosa moist  Respiratory: Airway patent, respirations unlabored  CVS: Regular rate and rhythm  Abdomen: Soft, nontender, nondistended  Extremities: No edema, sensation and movement grossly intact. Wound vac in place volarly. Dorsal wound healing, no erythema, minimal foul smelling ss discharge expressible. Does not appear grossly infected.  Skin: Warm, dry, appropriate color

## 2022-07-10 NOTE — ED PROVIDER NOTE - PROGRESS NOTE DETAILS
KVNG Montesinos - patient seen by plastic surgery fellow. low suspicion for wound infection, changed dressing and achieved proper seal for wound vac. stable for DC with oupt follow up with Dr. Kumar on tuesday.

## 2022-07-10 NOTE — ED PROVIDER NOTE - PHYSICAL EXAMINATION
GEN: Pt in NAD, A&O x4.  HEENT: Head NCAT. Sclera white w/o injection. Nose without deformity, . Neck supple FROM.   RESP: No chest wall tenderness, CTA b/l, no wheezes, rales, or rhonchi.   CARDIAC: RRR, clear distinct S1, S2.  Skin well perfused.  VASC: radial equal pulses b/l.  +Surgical wounds - first on anterior L. radius with dressing soaked in serosanguinous d/c; posterior surgical wound attached to VAC, same d/c color.  Mild odor from dressings.  NEURO:  Normal and equal sensation in b/l UE.    MSK:  VAC drain and dressings in place.  No gross deformity.  Sling in place.  SKIN: No rashes noted.  No purulent drainage or warmth.

## 2022-07-10 NOTE — ED ADULT TRIAGE NOTE - CHIEF COMPLAINT QUOTE
4th surgery l arm 7/5- vacuum drain  in place.   drainage from wound since yesterday.  denies fever 4th surgery l arm 7/5- vacuum drain  in place.   drainage from wound since yesterday.  denies fever. seen in [place] last pm for same- referred [facility] as per pt

## 2022-07-10 NOTE — ED PROVIDER NOTE - PATIENT PORTAL LINK FT
You can access the FollowMyHealth Patient Portal offered by [facility] by registering at the following website: [email or web address]. By joining [facility]’s FollowMyHealth portal, you will also be able to view your health information using other applications (apps) compatible with our system.
Implemented All Fall with Harm Risk Interventions:  [place] to call system. Call bell, personal items and telephone within reach. Instruct patient to call for assistance. Room bathroom lighting operational. Non-slip footwear when patient is off stretcher. Physically safe environment: no spills, clutter or unnecessary equipment. Stretcher in lowest position, wheels locked, appropriate side rails in place. Provide visual cue, wrist band, yellow gown, etc. Monitor gait and stability. Monitor for mental status changes and reorient to person, place, and time. Review medications for side effects contributing to fall risk. Reinforce activity limits and safety measures with patient and family. Provide visual clues: red socks.

## 2022-07-12 ENCOUNTER — APPOINTMENT (OUTPATIENT)
Dept: PLASTIC SURGERY | Facility: CLINIC | Age: 37
End: 2022-07-12

## 2022-07-12 VITALS
SYSTOLIC BLOOD PRESSURE: 156 MMHG | BODY MASS INDEX: 32.78 KG/M2 | HEART RATE: 116 BPM | DIASTOLIC BLOOD PRESSURE: 82 MMHG | TEMPERATURE: 98 F | WEIGHT: 204 LBS | OXYGEN SATURATION: 98 % | HEIGHT: 66 IN

## 2022-07-12 RX ORDER — DOXYCYCLINE 100 MG/1
100 CAPSULE ORAL
Qty: 20 | Refills: 0 | Status: ACTIVE | COMMUNITY
Start: 2022-07-12 | End: 1900-01-01

## 2022-07-12 RX ORDER — DOXYCYCLINE HYCLATE 100 MG/1
100 CAPSULE ORAL
Qty: 20 | Refills: 0 | Status: ACTIVE | COMMUNITY
Start: 2022-07-12 | End: 1900-01-01

## 2022-07-15 ENCOUNTER — APPOINTMENT (OUTPATIENT)
Dept: PLASTIC SURGERY | Facility: CLINIC | Age: 37
End: 2022-07-15

## 2022-07-15 VITALS
TEMPERATURE: 98.6 F | WEIGHT: 204 LBS | HEIGHT: 66 IN | BODY MASS INDEX: 32.78 KG/M2 | SYSTOLIC BLOOD PRESSURE: 149 MMHG | DIASTOLIC BLOOD PRESSURE: 90 MMHG | HEART RATE: 94 BPM | OXYGEN SATURATION: 100 %

## 2022-07-15 NOTE — REASON FOR VISIT
[Post Op: _________] : a [unfilled] post op visit [FreeTextEntry1] : s/p debridement of LT forearm wound with reconstruction DOS: 7/5/22

## 2022-07-19 ENCOUNTER — APPOINTMENT (OUTPATIENT)
Dept: ORTHOPEDIC SURGERY | Facility: CLINIC | Age: 37
End: 2022-07-19

## 2022-07-19 PROCEDURE — 73090 X-RAY EXAM OF FOREARM: CPT | Mod: LT

## 2022-07-19 PROCEDURE — 99024 POSTOP FOLLOW-UP VISIT: CPT

## 2022-07-19 RX ORDER — DOXYCYCLINE 100 MG/1
100 TABLET, FILM COATED ORAL
Qty: 60 | Refills: 0 | Status: ACTIVE | COMMUNITY
Start: 2022-07-19 | End: 1900-01-01

## 2022-07-28 ENCOUNTER — APPOINTMENT (OUTPATIENT)
Dept: PLASTIC SURGERY | Facility: CLINIC | Age: 37
End: 2022-07-28

## 2022-07-28 VITALS
WEIGHT: 204 LBS | DIASTOLIC BLOOD PRESSURE: 83 MMHG | HEIGHT: 66 IN | TEMPERATURE: 97 F | OXYGEN SATURATION: 97 % | SYSTOLIC BLOOD PRESSURE: 138 MMHG | BODY MASS INDEX: 32.78 KG/M2 | HEART RATE: 82 BPM

## 2022-07-28 NOTE — REASON FOR VISIT
[Post Op: _________] : a [unfilled] post op visit [FreeTextEntry1] : S/p debridement of LT forearm wound with reconstruction DOS 07/05/22. Pt denies any complaints.

## 2022-08-09 ENCOUNTER — APPOINTMENT (OUTPATIENT)
Dept: PLASTIC SURGERY | Facility: CLINIC | Age: 37
End: 2022-08-09

## 2022-08-09 VITALS
TEMPERATURE: 96.9 F | SYSTOLIC BLOOD PRESSURE: 167 MMHG | HEIGHT: 66 IN | HEART RATE: 111 BPM | BODY MASS INDEX: 32.78 KG/M2 | OXYGEN SATURATION: 94 % | DIASTOLIC BLOOD PRESSURE: 98 MMHG | WEIGHT: 204 LBS

## 2022-08-09 PROCEDURE — 99024 POSTOP FOLLOW-UP VISIT: CPT

## 2022-08-16 ENCOUNTER — APPOINTMENT (OUTPATIENT)
Dept: ORTHOPEDIC SURGERY | Facility: CLINIC | Age: 37
End: 2022-08-16

## 2022-08-16 PROCEDURE — 99024 POSTOP FOLLOW-UP VISIT: CPT

## 2022-08-16 PROCEDURE — 73090 X-RAY EXAM OF FOREARM: CPT | Mod: LT

## 2022-08-16 RX ORDER — DOXYCYCLINE 100 MG/1
100 TABLET, FILM COATED ORAL
Qty: 60 | Refills: 2 | Status: ACTIVE | COMMUNITY
Start: 2022-08-16 | End: 1900-01-01

## 2022-08-23 ENCOUNTER — APPOINTMENT (OUTPATIENT)
Dept: PLASTIC SURGERY | Facility: CLINIC | Age: 37
End: 2022-08-23

## 2022-08-23 VITALS
OXYGEN SATURATION: 99 % | BODY MASS INDEX: 32.78 KG/M2 | WEIGHT: 204 LBS | DIASTOLIC BLOOD PRESSURE: 88 MMHG | TEMPERATURE: 98.2 F | HEART RATE: 75 BPM | SYSTOLIC BLOOD PRESSURE: 150 MMHG | HEIGHT: 66 IN

## 2022-08-23 PROCEDURE — 99024 POSTOP FOLLOW-UP VISIT: CPT

## 2022-08-30 NOTE — ASU PREOP CHECKLIST - WARM FLUIDS/WARM BLANKETS
[FreeTextEntry1] : 6/9/2022–RBC 5.38, hemoglobin 12.0, hematocrit 40.3, MCV 74.9, WBC 5.7 with 57% neutrophils, 33% lymphocytes, platelet count 278,000.  Ferritin 418, vitamin B12–586, serum iron 71, TIBC 270, 26% iron saturation.  Creatinine 1.24, calcium 10.0, total protein 7.4, total bilirubin 0.4.\par 6/13/2018–hemoglobin 12.3, MCV 72.\par 6/29/22 CEA 7.4.\par 8/1/22-CT A/P-IMPRESSION:\par No bowel obstruction or gross bowel wall thickening. Normal appendix.\par \par Diffuse hepatic steatosis. Areas of peripheral hypervascularity in the \par right hepatic lobe may represent arterioportal shunting or flash filling \par hemangiomas however MRI of the abdomen with and without contrast can be \par obtained for further evaluation.\par \par  no

## 2022-09-13 ENCOUNTER — APPOINTMENT (OUTPATIENT)
Dept: ORTHOPEDIC SURGERY | Facility: CLINIC | Age: 37
End: 2022-09-13

## 2022-09-13 DIAGNOSIS — M86.432: ICD-10-CM

## 2022-09-13 PROCEDURE — 73090 X-RAY EXAM OF FOREARM: CPT | Mod: LT

## 2022-09-13 PROCEDURE — 99024 POSTOP FOLLOW-UP VISIT: CPT

## 2022-09-22 ENCOUNTER — APPOINTMENT (OUTPATIENT)
Dept: PLASTIC SURGERY | Facility: CLINIC | Age: 37
End: 2022-09-22

## 2022-09-22 PROCEDURE — 99024 POSTOP FOLLOW-UP VISIT: CPT

## 2022-10-06 ENCOUNTER — APPOINTMENT (OUTPATIENT)
Dept: ORTHOPEDIC SURGERY | Facility: CLINIC | Age: 37
End: 2022-10-06

## 2022-10-12 ENCOUNTER — APPOINTMENT (OUTPATIENT)
Dept: ORTHOPEDIC SURGERY | Facility: CLINIC | Age: 37
End: 2022-10-12

## 2022-10-12 PROCEDURE — 99214 OFFICE O/P EST MOD 30 MIN: CPT

## 2022-10-12 PROCEDURE — 73090 X-RAY EXAM OF FOREARM: CPT | Mod: LT

## 2022-11-28 RX ORDER — DOXYCYCLINE 100 MG/1
100 TABLET, FILM COATED ORAL
Qty: 60 | Refills: 2 | Status: ACTIVE | COMMUNITY
Start: 2022-11-28 | End: 1900-01-01

## 2023-03-01 ENCOUNTER — APPOINTMENT (OUTPATIENT)
Dept: ORTHOPEDIC SURGERY | Facility: CLINIC | Age: 38
End: 2023-03-01
Payer: MEDICAID

## 2023-03-13 NOTE — ED PROVIDER NOTE - CLINICAL SUMMARY MEDICAL DECISION MAKING FREE TEXT BOX
35 M w/ of opiate abuse presents to the ED w/ chest pain Plastic Surgeon Procedure Text (A): After obtaining clear surgical margins the patient was sent to plastics for surgical repair.  The patient understands they will receive post-surgical care and follow-up from the referring physician's office. 35 M w/ of IVDA, chronic wound infection, transfer for admission, possible amputation. blood work, culture.

## 2023-03-15 ENCOUNTER — APPOINTMENT (OUTPATIENT)
Dept: ORTHOPEDIC SURGERY | Facility: CLINIC | Age: 38
End: 2023-03-15
Payer: MEDICAID

## 2023-03-15 PROCEDURE — 73090 X-RAY EXAM OF FOREARM: CPT | Mod: LT

## 2023-03-15 PROCEDURE — 99214 OFFICE O/P EST MOD 30 MIN: CPT

## 2023-03-15 RX ORDER — CLINDAMYCIN HYDROCHLORIDE 300 MG/1
300 CAPSULE ORAL 3 TIMES DAILY
Qty: 180 | Refills: 11 | Status: ACTIVE | COMMUNITY
Start: 2023-03-15 | End: 1900-01-01

## 2023-03-22 NOTE — ASU PATIENT PROFILE, ADULT - NSCAGESTDRUGANNOY_GEN_A_CORE_SD
no Headache Monitoring: I recommended monitoring the headaches for now. There is no evidence of increased intracranial pressure. They were instructed to call if the headaches are worsening. Retinoid Dermatitis Normal Treatment: I recommended more frequent application of Cetaphil or CeraVe to the areas of dermatitis. Cheilitis Normal Treatment: I recommended application of Vaseline or Aquaphor numerous times a day (as often as every hour) and before going to bed. Upper Range (In Mg/Kg): 150 Hypertriglyceridemia Monitoring: I explained this is common when taking isotretinoin. We will monitor closely. Use Enhanced Counseling Feature (Automatic): No Cheilitis Aggressive Treatment: I recommended application of Vaseline or Aquaphor numerous times a day (as often as every hour) and before going to bed. I also prescribed a topical steroid for twice daily use. Target Cumulative Dosage (In Mg/Kg): 135 Is Xerosis Present?: Yes - Normal Treatment Counseling Text: I reviewed the side effect in detail. Patient should get monthly blood tests, not donate blood, not drive at night if vision affected, and not share medication. Dosing Month 6 (Required For Cumulative Dosing): 100mg Daily Myalgia Monitoring: I explained this is common when taking isotretinoin. If this worsens they will contact us. Weight Units: pounds Add Associated Diagnosis When Managing Medication Side Effects: Yes Months Of Therapy Completed: 7 Female Pregnancy Counseling Text: Female patients should also be on two forms of birth control while taking this medication and for one month after their last dose. Myalgia Treatment: I explained this is common when taking isotretinoin. If this worsens they will contact us. They may try OTC ibuprofen. Next Month's Dosage: Discontinue Retinoid Dermatitis Aggressive Treatment: I recommended more frequent application of Cetaphil or CeraVe to the areas of dermatitis. I also prescribed a topical steroid for twice daily use until the dermatitis resolves. Comments: Pt will take the rest of what he has at home and that will be it Dosing Month 1 (Required For Cumulative Dosing): 20mg BID Xerosis Normal Treatment: I recommended application of Cetaphil or CeraVe numerous times a day and before going to bed to all dry areas. Xerosis Aggressive Treatment: I recommended application of Cetaphil or CeraVe numerous times a day and before going to bed to all dry areas. I also prescribed a topical steroid for twice daily use. Nosebleeds Normal Treatment: I explained this is common when taking isotretinoin. I recommended saline mist in each nostril multiple times a day. If this worsens they will contact us. Detail Level: Zone Dosing Month 2 (Required For Cumulative Dosing): 40mg BID Xerosis Normal Treatment: I recommended application of Cetaphil or CeraVe numerous times a day going to bed to all dry areas. What Is The Patient's Gender: Male Use Therapeutic Ranged Or Therapeutic Target: please select Range or Target Pounds Preamble Statement (Weight Entered In Details Tab): Reported Weight in pounds: Kilograms Preamble Statement (Weight Entered In Details Tab): Reported Weight in kilograms: Female Completion Statement: After discussing her treatment course we decided to discontinue isotretinoin therapy at this time. I explained that she would need to continue her birth control methods for at least one month after the last dosage. She should also get a pregnancy test one month after the last dose. She shouldn't donate blood for one month after the last dose. She should call with any new symptoms of depression. Xerosis Aggressive Treatment: I recommended application of Cetaphil or CeraVe numerous times a day going to bed to all dry areas. I also prescribed a topical steroid for twice daily use. Lower Range (In Mg/Kg): 120 Ipledge Number (Optional): [id number] Male Completion Statement: After discussing his treatment course we decided to discontinue isotretinoin therapy at this time. He shouldn't donate blood for one month after the last dose. He should call with any new symptoms of depression.

## 2023-08-21 ENCOUNTER — APPOINTMENT (OUTPATIENT)
Dept: ORTHOPEDIC SURGERY | Facility: CLINIC | Age: 38
End: 2023-08-21

## 2023-09-14 ENCOUNTER — APPOINTMENT (OUTPATIENT)
Dept: ORTHOPEDIC SURGERY | Facility: CLINIC | Age: 38
End: 2023-09-14
Payer: MEDICAID

## 2023-09-14 DIAGNOSIS — S52.502N: ICD-10-CM

## 2023-09-14 DIAGNOSIS — S52.602N: ICD-10-CM

## 2023-09-14 PROCEDURE — 99214 OFFICE O/P EST MOD 30 MIN: CPT | Mod: 25

## 2023-09-14 RX ORDER — SULFAMETHOXAZOLE AND TRIMETHOPRIM 800; 160 MG/1; MG/1
800-160 TABLET ORAL TWICE DAILY
Qty: 360 | Refills: 0 | Status: ACTIVE | COMMUNITY
Start: 2021-08-31 | End: 1900-01-01

## 2024-01-02 NOTE — ASU DISCHARGE PLAN (ADULT/PEDIATRIC) - NO EXERCISE DURATION
as per MD What Type Of Note Output Would You Prefer (Optional)?: Standard Output How Severe Is Your Rash?: severe Is This A New Presentation, Or A Follow-Up?: Rash

## 2024-01-15 NOTE — ED PROVIDER NOTE - NSBENEFITOFTRANSFER_ED_A_ED
Obtain Level of Care/Service Not Available at this Facility
Dizziness/Frequent toileting needed/Altered elimination/Disorientation/Impaired gait/IV and/or equipment tethered to patient/Weakness

## 2024-01-16 ENCOUNTER — RX RENEWAL (OUTPATIENT)
Age: 39
End: 2024-01-16

## 2024-01-16 RX ORDER — SULFAMETHOXAZOLE AND TRIMETHOPRIM 800; 160 MG/1; MG/1
800-160 TABLET ORAL TWICE DAILY
Qty: 360 | Refills: 1 | Status: ACTIVE | COMMUNITY
Start: 2023-09-18 | End: 1900-01-01

## 2024-02-22 NOTE — ASU PATIENT PROFILE, ADULT - TRANSFUSION PREMEDICATION REQUIRED
Chief Complaint   Patient presents with    Urinary Retention     Pt BIB by ems with a complain of inability to urinate since 2000 2/21/24. On arrival had visible incontinence of bowel and bladder. Pt also complains of back pain and lower abdominal pain which he thinks is related to urinary retention.    Abdominal Pain       
none

## 2024-04-09 ENCOUNTER — APPOINTMENT (OUTPATIENT)
Dept: ORTHOPEDIC SURGERY | Facility: CLINIC | Age: 39
End: 2024-04-09
Payer: MEDICAID

## 2024-04-09 DIAGNOSIS — S51.809A UNSPECIFIED OPEN WOUND OF UNSPECIFIED FOREARM, INITIAL ENCOUNTER: ICD-10-CM

## 2024-04-09 PROCEDURE — 73090 X-RAY EXAM OF FOREARM: CPT | Mod: LT

## 2024-04-09 PROCEDURE — 99214 OFFICE O/P EST MOD 30 MIN: CPT | Mod: 25

## 2024-04-15 ENCOUNTER — APPOINTMENT (OUTPATIENT)
Dept: ORTHOPEDIC SURGERY | Facility: CLINIC | Age: 39
End: 2024-04-15
Payer: MEDICAID

## 2024-04-15 VITALS — WEIGHT: 195 LBS | HEIGHT: 66 IN | BODY MASS INDEX: 31.34 KG/M2

## 2024-04-15 VITALS — DIASTOLIC BLOOD PRESSURE: 82 MMHG | SYSTOLIC BLOOD PRESSURE: 145 MMHG | HEART RATE: 73 BPM

## 2024-04-15 DIAGNOSIS — M16.12 UNILATERAL PRIMARY OSTEOARTHRITIS, LEFT HIP: ICD-10-CM

## 2024-04-15 PROCEDURE — 99215 OFFICE O/P EST HI 40 MIN: CPT

## 2024-04-15 PROCEDURE — 72100 X-RAY EXAM L-S SPINE 2/3 VWS: CPT

## 2024-04-15 PROCEDURE — 73502 X-RAY EXAM HIP UNI 2-3 VIEWS: CPT

## 2024-06-08 ENCOUNTER — NON-APPOINTMENT (OUTPATIENT)
Age: 39
End: 2024-06-08

## 2024-07-10 NOTE — ED PROVIDER NOTE - BIRTH SEX
Patient discharged to home at this time. All discharge instructions provided to patient. All questions answered. IV removed. No distress noted at discharge.    Male no

## 2024-08-21 ENCOUNTER — APPOINTMENT (OUTPATIENT)
Dept: CT IMAGING | Facility: CLINIC | Age: 39
End: 2024-08-21
Payer: MEDICAID

## 2024-08-21 ENCOUNTER — OUTPATIENT (OUTPATIENT)
Dept: OUTPATIENT SERVICES | Facility: HOSPITAL | Age: 39
LOS: 1 days | End: 2024-08-21
Payer: COMMERCIAL

## 2024-08-21 DIAGNOSIS — M16.12 UNILATERAL PRIMARY OSTEOARTHRITIS, LEFT HIP: ICD-10-CM

## 2024-08-21 DIAGNOSIS — Z98.890 OTHER SPECIFIED POSTPROCEDURAL STATES: Chronic | ICD-10-CM

## 2024-08-21 PROCEDURE — 73700 CT LOWER EXTREMITY W/O DYE: CPT | Mod: 26,LT

## 2024-08-21 PROCEDURE — 73700 CT LOWER EXTREMITY W/O DYE: CPT

## 2024-08-23 ENCOUNTER — OUTPATIENT (OUTPATIENT)
Dept: OUTPATIENT SERVICES | Facility: HOSPITAL | Age: 39
LOS: 1 days | End: 2024-08-23

## 2024-08-23 VITALS
TEMPERATURE: 99 F | HEART RATE: 86 BPM | HEIGHT: 65.5 IN | SYSTOLIC BLOOD PRESSURE: 133 MMHG | DIASTOLIC BLOOD PRESSURE: 78 MMHG | RESPIRATION RATE: 18 BRPM | WEIGHT: 194.01 LBS | OXYGEN SATURATION: 98 %

## 2024-08-23 DIAGNOSIS — M16.12 UNILATERAL PRIMARY OSTEOARTHRITIS, LEFT HIP: ICD-10-CM

## 2024-08-23 DIAGNOSIS — Z98.890 OTHER SPECIFIED POSTPROCEDURAL STATES: Chronic | ICD-10-CM

## 2024-08-23 LAB
A1C WITH ESTIMATED AVERAGE GLUCOSE RESULT: 5.5 % — SIGNIFICANT CHANGE UP (ref 4–5.6)
ANION GAP SERPL CALC-SCNC: 12 MMOL/L — SIGNIFICANT CHANGE UP (ref 7–14)
APPEARANCE UR: CLEAR — SIGNIFICANT CHANGE UP
BACTERIA # UR AUTO: NEGATIVE /HPF — SIGNIFICANT CHANGE UP
BASOPHILS # BLD AUTO: 0.01 K/UL — SIGNIFICANT CHANGE UP (ref 0–0.2)
BASOPHILS NFR BLD AUTO: 0.1 % — SIGNIFICANT CHANGE UP (ref 0–2)
BILIRUB UR-MCNC: NEGATIVE — SIGNIFICANT CHANGE UP
BLD GP AB SCN SERPL QL: NEGATIVE — SIGNIFICANT CHANGE UP
BUN SERPL-MCNC: 12 MG/DL — SIGNIFICANT CHANGE UP (ref 7–23)
CALCIUM SERPL-MCNC: 9.3 MG/DL — SIGNIFICANT CHANGE UP (ref 8.4–10.5)
CAST: 0 /LPF — SIGNIFICANT CHANGE UP (ref 0–4)
CHLORIDE SERPL-SCNC: 99 MMOL/L — SIGNIFICANT CHANGE UP (ref 98–107)
CO2 SERPL-SCNC: 25 MMOL/L — SIGNIFICANT CHANGE UP (ref 22–31)
COLOR SPEC: SIGNIFICANT CHANGE UP
CREAT SERPL-MCNC: 0.98 MG/DL — SIGNIFICANT CHANGE UP (ref 0.5–1.3)
CRP SERPL-MCNC: 60 MG/L — HIGH
DIFF PNL FLD: NEGATIVE — SIGNIFICANT CHANGE UP
EGFR: 99 ML/MIN/1.73M2 — SIGNIFICANT CHANGE UP
EOSINOPHIL # BLD AUTO: 0.04 K/UL — SIGNIFICANT CHANGE UP (ref 0–0.5)
EOSINOPHIL NFR BLD AUTO: 0.6 % — SIGNIFICANT CHANGE UP (ref 0–6)
ESTIMATED AVERAGE GLUCOSE: 111 — SIGNIFICANT CHANGE UP
FERRITIN SERPL-MCNC: 95 NG/ML — SIGNIFICANT CHANGE UP (ref 30–400)
GLUCOSE SERPL-MCNC: 93 MG/DL — SIGNIFICANT CHANGE UP (ref 70–99)
GLUCOSE UR QL: NEGATIVE MG/DL — SIGNIFICANT CHANGE UP
HCT VFR BLD CALC: 39.5 % — SIGNIFICANT CHANGE UP (ref 39–50)
HGB BLD-MCNC: 12.6 G/DL — LOW (ref 13–17)
IMM GRANULOCYTES NFR BLD AUTO: 0.4 % — SIGNIFICANT CHANGE UP (ref 0–0.9)
IRON SATN MFR SERPL: 22 UG/DL — LOW (ref 45–165)
IRON SATN MFR SERPL: 8 % — LOW (ref 16–55)
KETONES UR-MCNC: ABNORMAL MG/DL
LEUKOCYTE ESTERASE UR-ACNC: ABNORMAL
LYMPHOCYTES # BLD AUTO: 0.85 K/UL — LOW (ref 1–3.3)
LYMPHOCYTES # BLD AUTO: 11.7 % — LOW (ref 13–44)
MCHC RBC-ENTMCNC: 24.7 PG — LOW (ref 27–34)
MCHC RBC-ENTMCNC: 31.9 GM/DL — LOW (ref 32–36)
MCV RBC AUTO: 77.3 FL — LOW (ref 80–100)
MONOCYTES # BLD AUTO: 0.41 K/UL — SIGNIFICANT CHANGE UP (ref 0–0.9)
MONOCYTES NFR BLD AUTO: 5.6 % — SIGNIFICANT CHANGE UP (ref 2–14)
NEUTROPHILS # BLD AUTO: 5.92 K/UL — SIGNIFICANT CHANGE UP (ref 1.8–7.4)
NEUTROPHILS NFR BLD AUTO: 81.6 % — HIGH (ref 43–77)
NITRITE UR-MCNC: NEGATIVE — SIGNIFICANT CHANGE UP
PH UR: 5.5 — SIGNIFICANT CHANGE UP (ref 5–8)
PLATELET # BLD AUTO: 227 K/UL — SIGNIFICANT CHANGE UP (ref 150–400)
POTASSIUM SERPL-MCNC: 4.2 MMOL/L — SIGNIFICANT CHANGE UP (ref 3.5–5.3)
POTASSIUM SERPL-SCNC: 4.2 MMOL/L — SIGNIFICANT CHANGE UP (ref 3.5–5.3)
PROT UR-MCNC: 30 MG/DL
RBC # BLD: 5.11 M/UL — SIGNIFICANT CHANGE UP (ref 4.2–5.8)
RBC # FLD: 15.1 % — HIGH (ref 10.3–14.5)
RBC CASTS # UR COMP ASSIST: 1 /HPF — SIGNIFICANT CHANGE UP (ref 0–4)
RETICS #: 51.8 K/UL — SIGNIFICANT CHANGE UP (ref 25–125)
RETICS/RBC NFR: 1 % — SIGNIFICANT CHANGE UP (ref 0.5–2.5)
RH IG SCN BLD-IMP: POSITIVE — SIGNIFICANT CHANGE UP
SODIUM SERPL-SCNC: 136 MMOL/L — SIGNIFICANT CHANGE UP (ref 135–145)
SP GR SPEC: 1.03 — HIGH (ref 1–1.03)
SQUAMOUS # UR AUTO: 0 /HPF — SIGNIFICANT CHANGE UP (ref 0–5)
TIBC SERPL-MCNC: 265 UG/DL — SIGNIFICANT CHANGE UP (ref 220–430)
UIBC SERPL-MCNC: 244 UG/DL — SIGNIFICANT CHANGE UP (ref 110–370)
UROBILINOGEN FLD QL: 1 MG/DL — SIGNIFICANT CHANGE UP (ref 0.2–1)
WBC # BLD: 7.26 K/UL — SIGNIFICANT CHANGE UP (ref 3.8–10.5)
WBC # FLD AUTO: 7.26 K/UL — SIGNIFICANT CHANGE UP (ref 3.8–10.5)
WBC UR QL: 0 /HPF — SIGNIFICANT CHANGE UP (ref 0–5)

## 2024-08-23 RX ORDER — CHLORHEXIDINE GLUCONATE 40 MG/ML
1 SOLUTION TOPICAL DAILY
Refills: 0 | Status: DISCONTINUED | OUTPATIENT
Start: 2024-09-05 | End: 2024-09-05

## 2024-08-23 NOTE — H&P PST ADULT - ATTENDING COMMENTS
I have consented the patient for LEFT CELSO. We discussed risks, benefits and alternatives. Rationale of care was reviewed and all questions were answered. Surgical risks include but are not limited to infection, bleeding, nerve damage, chronic pain, VTE, LLD, dislocation, fracture, limited ROM, weakness, hardware failure, limited longevity of implants, loss of life/limb, and need for further surgical procedures.  The patient understood all of this and would like to proceed. I have initially consented the patient for LEFT CELSO, however the possibility of a staged procedure in the setting of a septic hip was also discussed and he therefore decided to hold off on surgery for now.    Recent CRP 8/23/24 was elevated (60) and he also appeared diaphoretic. For these reasons, I have recommended further diagnostic tests to rule out recurrence of his left forearm infection and to rule out a L septic hip. He agreed to this approach. FU in the office after workup is performed.

## 2024-08-23 NOTE — H&P PST ADULT - MUSCULOSKELETAL COMMENTS
Pre-op diagnosis: Unilateral Primary Osteoarthritis Left Hip, walks with limp, difficulty weight bearing on left leg

## 2024-08-23 NOTE — H&P PST ADULT - NSICDXPASTSURGICALHX_GEN_ALL_CORE_FT
PAST SURGICAL HISTORY:  H/O wrist surgery left groin flat division and closure, LT radius ORIF with iliac crest bone graft June 2021    S/P debridement     S/P hardware removal     Status post debridement left arm, placement of external fixator 04/2021   I&D left forearm wound, antibiotic spacer placement re-application of external fixator, groin flap 5/25/21

## 2024-08-23 NOTE — H&P PST ADULT - NSICDXPASTMEDICALHX_GEN_ALL_CORE_FT
PAST MEDICAL HISTORY:  Anemia     Chronic osteomyelitis of right ulna     Fracture of radius     History of heroin abuse last use 2018    Lumbar herniated disc      PAST MEDICAL HISTORY:  Anemia     Chronic osteomyelitis of right ulna     Fracture of radius     History of heroin abuse last use 2018    Lumbar herniated disc     Osteoarthritis

## 2024-08-23 NOTE — H&P PST ADULT - PROBLEM SELECTOR PLAN 1
Patient is tentatively scheduled for Left Total Hip Arthroplasty, Direct Anterior, Jigar Robotic Assisted on 9/5/24. Pre-op Spine instructions provided to patient. Patient given verbal and written instructions on Chlorhexidine soap for 3 days and Pepcid. Patient verbalized understanding.     T&S, CBC, BMP, A1C, UA, Urine Culture, MRSA swab sent at [facility] [place]

## 2024-08-23 NOTE — H&P PST ADULT - NEGATIVE RESPIRATORY AND THORAX SYMPTOMS
Urine culture added, please link. Can start cipro for 3 days, can also take OTC AZO for any dysuria   no wheezing/no dyspnea/no cough

## 2024-08-23 NOTE — H&P PST ADULT - FUNCTIONAL STATUS
DASI score 5.72 - able to participate in light housework, able to walk 1-2 blocks, limited by hip pain, denies shortness of breath

## 2024-08-23 NOTE — H&P PST ADULT - HISTORY OF PRESENT ILLNESS
39 year old male, former IVDU, former smoker, w/ hx of lumbar radiculopathy, L forearm pathologic fx due to osteomyelitis s/p repair and reconstruction w/ left groin flap w/ exposed bone (6/2021). Patient with left hip DJD, previously not a candidate for hip replacement due exposed hardware in left forearm, now fully healed. Patient is scheduled for Left total Hip Arthroplasty, Direct Anterior, Jigar Robotic Assisted.       ?

## 2024-08-24 LAB
MRSA PCR RESULT.: SIGNIFICANT CHANGE UP
S AUREUS DNA NOSE QL NAA+PROBE: SIGNIFICANT CHANGE UP

## 2024-08-25 LAB
CULTURE RESULTS: NO GROWTH — SIGNIFICANT CHANGE UP
SPECIMEN SOURCE: SIGNIFICANT CHANGE UP

## 2024-08-26 ENCOUNTER — NON-APPOINTMENT (OUTPATIENT)
Age: 39
End: 2024-08-26

## 2024-09-04 NOTE — ASU PATIENT PROFILE, ADULT - FALL HARM RISK - UNIVERSAL INTERVENTIONS
Bed in lowest position, wheels locked, appropriate side rails in place/Call bell, personal items and telephone in reach/Instruct patient to call for assistance before getting out of bed or chair/Non-slip footwear when patient is out of bed/[place] to call system/Physically safe environment - no spills, clutter or unnecessary equipment/Purposeful Proactive Rounding/Room/bathroom lighting operational, light cord in reach

## 2024-09-04 NOTE — ASU PATIENT PROFILE, ADULT - NSICDXPASTMEDICALHX_GEN_ALL_CORE_FT
PAST MEDICAL HISTORY:  Anemia     Chronic osteomyelitis of right ulna     Fracture of radius     History of heroin abuse last use 2018    Lumbar herniated disc     Osteoarthritis

## 2024-09-05 ENCOUNTER — INPATIENT (INPATIENT)
Facility: HOSPITAL | Age: 39
LOS: 0 days | Discharge: HOME | End: 2024-09-05
Attending: ORTHOPAEDIC SURGERY | Admitting: ORTHOPAEDIC SURGERY

## 2024-09-05 ENCOUNTER — APPOINTMENT (OUTPATIENT)
Dept: ORTHOPEDIC SURGERY | Facility: HOSPITAL | Age: 39
End: 2024-09-05

## 2024-09-05 ENCOUNTER — NON-APPOINTMENT (OUTPATIENT)
Age: 39
End: 2024-09-05

## 2024-09-05 VITALS
HEART RATE: 71 BPM | SYSTOLIC BLOOD PRESSURE: 130 MMHG | HEIGHT: 65.5 IN | DIASTOLIC BLOOD PRESSURE: 80 MMHG | OXYGEN SATURATION: 98 % | WEIGHT: 194.01 LBS | RESPIRATION RATE: 16 BRPM | TEMPERATURE: 98 F

## 2024-09-05 DIAGNOSIS — Z98.890 OTHER SPECIFIED POSTPROCEDURAL STATES: Chronic | ICD-10-CM

## 2024-09-05 DIAGNOSIS — M16.12 UNILATERAL PRIMARY OSTEOARTHRITIS, LEFT HIP: ICD-10-CM

## 2024-09-05 LAB — GLUCOSE BLDC GLUCOMTR-MCNC: 120 MG/DL — HIGH (ref 70–99)

## 2024-09-05 RX ORDER — PANTOPRAZOLE SODIUM 40 MG
40 TABLET, DELAYED RELEASE (ENTERIC COATED) ORAL ONCE
Refills: 0 | Status: DISCONTINUED | OUTPATIENT
Start: 2024-09-05 | End: 2024-09-05

## 2024-09-05 RX ORDER — TRAMADOL HYDROCHLORIDE 200 MG/1
50 TABLET, EXTENDED RELEASE ORAL ONCE
Refills: 0 | Status: COMPLETED | OUTPATIENT
Start: 2024-09-05 | End: 2024-09-05

## 2024-09-05 NOTE — ASU PREOP CHECKLIST - 3.
pre op medication protonix Tramadol pre op medication protonix Tramadol, not given due to cancellation of case

## 2024-09-12 PROBLEM — M19.90 UNSPECIFIED OSTEOARTHRITIS, UNSPECIFIED SITE: Chronic | Status: ACTIVE | Noted: 2024-08-23

## 2024-09-16 ENCOUNTER — APPOINTMENT (OUTPATIENT)
Dept: ORTHOPEDIC SURGERY | Facility: CLINIC | Age: 39
End: 2024-09-16

## 2024-09-16 ENCOUNTER — OUTPATIENT (OUTPATIENT)
Dept: OUTPATIENT SERVICES | Facility: HOSPITAL | Age: 39
LOS: 1 days | End: 2024-09-16

## 2024-09-16 DIAGNOSIS — Z98.890 OTHER SPECIFIED POSTPROCEDURAL STATES: Chronic | ICD-10-CM

## 2024-09-16 DIAGNOSIS — M16.12 UNILATERAL PRIMARY OSTEOARTHRITIS, LEFT HIP: ICD-10-CM

## 2024-09-17 ENCOUNTER — APPOINTMENT (OUTPATIENT)
Dept: RADIOLOGY | Facility: CLINIC | Age: 39
End: 2024-09-17
Payer: MEDICAID

## 2024-09-17 ENCOUNTER — RESULT REVIEW (OUTPATIENT)
Age: 39
End: 2024-09-17

## 2024-09-17 ENCOUNTER — OUTPATIENT (OUTPATIENT)
Dept: OUTPATIENT SERVICES | Facility: HOSPITAL | Age: 39
LOS: 1 days | End: 2024-09-17
Payer: COMMERCIAL

## 2024-09-17 DIAGNOSIS — M16.12 UNILATERAL PRIMARY OSTEOARTHRITIS, LEFT HIP: ICD-10-CM

## 2024-09-17 DIAGNOSIS — Z98.890 OTHER SPECIFIED POSTPROCEDURAL STATES: Chronic | ICD-10-CM

## 2024-09-17 PROCEDURE — 77002 NEEDLE LOCALIZATION BY XRAY: CPT | Mod: 26

## 2024-09-17 PROCEDURE — 20610 DRAIN/INJ JOINT/BURSA W/O US: CPT | Mod: LT

## 2024-09-17 PROCEDURE — 77002 NEEDLE LOCALIZATION BY XRAY: CPT

## 2024-09-17 PROCEDURE — 20610 DRAIN/INJ JOINT/BURSA W/O US: CPT

## 2024-10-17 ENCOUNTER — APPOINTMENT (OUTPATIENT)
Dept: ORTHOPEDIC SURGERY | Facility: CLINIC | Age: 39
End: 2024-10-17

## 2024-10-17 PROCEDURE — 99214 OFFICE O/P EST MOD 30 MIN: CPT

## 2024-11-19 ENCOUNTER — NON-APPOINTMENT (OUTPATIENT)
Age: 39
End: 2024-11-19

## 2024-11-27 ENCOUNTER — APPOINTMENT (OUTPATIENT)
Dept: ORTHOPEDIC SURGERY | Facility: HOSPITAL | Age: 39
End: 2024-11-27

## 2024-12-10 ENCOUNTER — APPOINTMENT (OUTPATIENT)
Dept: ORTHOPEDIC SURGERY | Facility: CLINIC | Age: 39
End: 2024-12-10

## 2025-01-07 ENCOUNTER — APPOINTMENT (OUTPATIENT)
Dept: PAIN MANAGEMENT | Facility: CLINIC | Age: 40
End: 2025-01-07
Payer: MEDICAID

## 2025-01-07 VITALS
OXYGEN SATURATION: 98 % | DIASTOLIC BLOOD PRESSURE: 86 MMHG | BODY MASS INDEX: 31.34 KG/M2 | TEMPERATURE: 97.6 F | WEIGHT: 195 LBS | HEART RATE: 98 BPM | RESPIRATION RATE: 17 BRPM | HEIGHT: 66 IN | SYSTOLIC BLOOD PRESSURE: 153 MMHG

## 2025-01-07 PROCEDURE — 99204 OFFICE O/P NEW MOD 45 MIN: CPT

## 2025-01-27 ENCOUNTER — APPOINTMENT (OUTPATIENT)
Dept: PAIN MANAGEMENT | Facility: AMBULATORY SURGERY CENTER | Age: 40
End: 2025-01-27

## 2025-03-05 ENCOUNTER — NON-APPOINTMENT (OUTPATIENT)
Age: 40
End: 2025-03-05

## 2025-03-07 ENCOUNTER — APPOINTMENT (OUTPATIENT)
Dept: ORTHOPEDIC SURGERY | Facility: CLINIC | Age: 40
End: 2025-03-07
Payer: MEDICAID

## 2025-03-07 ENCOUNTER — NON-APPOINTMENT (OUTPATIENT)
Age: 40
End: 2025-03-07

## 2025-03-07 DIAGNOSIS — M16.12 UNILATERAL PRIMARY OSTEOARTHRITIS, LEFT HIP: ICD-10-CM

## 2025-03-07 PROCEDURE — 73090 X-RAY EXAM OF FOREARM: CPT | Mod: LT

## 2025-03-07 PROCEDURE — 99215 OFFICE O/P EST HI 40 MIN: CPT

## 2025-03-07 PROCEDURE — 73502 X-RAY EXAM HIP UNI 2-3 VIEWS: CPT | Mod: LT

## 2025-03-31 ENCOUNTER — APPOINTMENT (OUTPATIENT)
Dept: INFECTIOUS DISEASE | Facility: CLINIC | Age: 40
End: 2025-03-31

## 2025-03-31 ENCOUNTER — OUTPATIENT (OUTPATIENT)
Dept: OUTPATIENT SERVICES | Facility: HOSPITAL | Age: 40
LOS: 1 days | End: 2025-03-31
Payer: COMMERCIAL

## 2025-03-31 VITALS
OXYGEN SATURATION: 95 % | TEMPERATURE: 97.8 F | BODY MASS INDEX: 32.14 KG/M2 | DIASTOLIC BLOOD PRESSURE: 80 MMHG | HEIGHT: 66 IN | WEIGHT: 200 LBS | SYSTOLIC BLOOD PRESSURE: 143 MMHG | HEART RATE: 95 BPM

## 2025-03-31 DIAGNOSIS — M19.90 UNSPECIFIED OSTEOARTHRITIS, UNSPECIFIED SITE: ICD-10-CM

## 2025-03-31 DIAGNOSIS — M51.369: ICD-10-CM

## 2025-03-31 DIAGNOSIS — Z98.890 OTHER SPECIFIED POSTPROCEDURAL STATES: Chronic | ICD-10-CM

## 2025-03-31 DIAGNOSIS — B97.89 OTHER VIRAL AGENTS AS THE CAUSE OF DISEASES CLASSIFIED ELSEWHERE: ICD-10-CM

## 2025-03-31 DIAGNOSIS — M51.369 OTHER INTERVERTEBRAL DISC DEGENERATION, LUMBAR REGION WITHOUT MENTION OF LUMBAR BACK PAIN OR LOWER EXTREMITY PAIN: ICD-10-CM

## 2025-03-31 PROCEDURE — G0463: CPT

## 2025-03-31 PROCEDURE — 99204 OFFICE O/P NEW MOD 45 MIN: CPT

## 2025-04-07 ENCOUNTER — APPOINTMENT (OUTPATIENT)
Dept: MRI IMAGING | Facility: CLINIC | Age: 40
End: 2025-04-07

## 2025-04-07 ENCOUNTER — OUTPATIENT (OUTPATIENT)
Dept: OUTPATIENT SERVICES | Facility: HOSPITAL | Age: 40
LOS: 1 days | End: 2025-04-07
Payer: COMMERCIAL

## 2025-04-07 DIAGNOSIS — Z98.890 OTHER SPECIFIED POSTPROCEDURAL STATES: Chronic | ICD-10-CM

## 2025-04-07 DIAGNOSIS — M16.12 UNILATERAL PRIMARY OSTEOARTHRITIS, LEFT HIP: ICD-10-CM

## 2025-04-07 PROCEDURE — 73723 MRI JOINT LWR EXTR W/O&W/DYE: CPT

## 2025-04-07 PROCEDURE — A9585: CPT

## 2025-04-07 PROCEDURE — 73723 MRI JOINT LWR EXTR W/O&W/DYE: CPT | Mod: 26,LT

## 2025-04-09 ENCOUNTER — NON-APPOINTMENT (OUTPATIENT)
Age: 40
End: 2025-04-09

## 2025-04-18 ENCOUNTER — OUTPATIENT (OUTPATIENT)
Dept: OUTPATIENT SERVICES | Facility: HOSPITAL | Age: 40
LOS: 1 days | End: 2025-04-18

## 2025-04-18 VITALS
OXYGEN SATURATION: 96 % | SYSTOLIC BLOOD PRESSURE: 120 MMHG | RESPIRATION RATE: 16 BRPM | HEIGHT: 65.5 IN | TEMPERATURE: 98 F | HEART RATE: 97 BPM | WEIGHT: 223.11 LBS | DIASTOLIC BLOOD PRESSURE: 61 MMHG

## 2025-04-18 DIAGNOSIS — M16.12 UNILATERAL PRIMARY OSTEOARTHRITIS, LEFT HIP: ICD-10-CM

## 2025-04-18 DIAGNOSIS — Z98.890 OTHER SPECIFIED POSTPROCEDURAL STATES: Chronic | ICD-10-CM

## 2025-04-18 LAB
A1C WITH ESTIMATED AVERAGE GLUCOSE RESULT: 6.2 % — HIGH (ref 4–5.6)
ANION GAP SERPL CALC-SCNC: 12 MMOL/L — SIGNIFICANT CHANGE UP (ref 7–14)
APPEARANCE UR: CLEAR — SIGNIFICANT CHANGE UP
BASOPHILS # BLD AUTO: 0.03 K/UL — SIGNIFICANT CHANGE UP (ref 0–0.2)
BASOPHILS NFR BLD AUTO: 0.5 % — SIGNIFICANT CHANGE UP (ref 0–2)
BILIRUB UR-MCNC: NEGATIVE — SIGNIFICANT CHANGE UP
BLD GP AB SCN SERPL QL: NEGATIVE — SIGNIFICANT CHANGE UP
BUN SERPL-MCNC: 13 MG/DL — SIGNIFICANT CHANGE UP (ref 7–23)
CALCIUM SERPL-MCNC: 9.2 MG/DL — SIGNIFICANT CHANGE UP (ref 8.4–10.5)
CHLORIDE SERPL-SCNC: 101 MMOL/L — SIGNIFICANT CHANGE UP (ref 98–107)
CO2 SERPL-SCNC: 25 MMOL/L — SIGNIFICANT CHANGE UP (ref 22–31)
COLOR SPEC: SIGNIFICANT CHANGE UP
CREAT SERPL-MCNC: 0.89 MG/DL — SIGNIFICANT CHANGE UP (ref 0.5–1.3)
CRP SERPL-MCNC: 24 MG/L — HIGH
DIFF PNL FLD: NEGATIVE — SIGNIFICANT CHANGE UP
EGFR: 110 ML/MIN/1.73M2 — SIGNIFICANT CHANGE UP
EGFR: 110 ML/MIN/1.73M2 — SIGNIFICANT CHANGE UP
EOSINOPHIL # BLD AUTO: 0.07 K/UL — SIGNIFICANT CHANGE UP (ref 0–0.5)
EOSINOPHIL NFR BLD AUTO: 1.2 % — SIGNIFICANT CHANGE UP (ref 0–6)
ESTIMATED AVERAGE GLUCOSE: 131 — SIGNIFICANT CHANGE UP
FERRITIN SERPL-MCNC: 48 NG/ML — SIGNIFICANT CHANGE UP (ref 30–400)
GLUCOSE SERPL-MCNC: 143 MG/DL — HIGH (ref 70–99)
GLUCOSE UR QL: NEGATIVE MG/DL — SIGNIFICANT CHANGE UP
HCT VFR BLD CALC: 41.3 % — SIGNIFICANT CHANGE UP (ref 39–50)
HGB BLD-MCNC: 12.9 G/DL — LOW (ref 13–17)
IMM GRANULOCYTES NFR BLD AUTO: 0.5 % — SIGNIFICANT CHANGE UP (ref 0–0.9)
IRON SATN MFR SERPL: 31 UG/DL — LOW (ref 45–165)
IRON SATN MFR SERPL: 9 % — LOW (ref 16–55)
KETONES UR-MCNC: NEGATIVE MG/DL — SIGNIFICANT CHANGE UP
LEUKOCYTE ESTERASE UR-ACNC: NEGATIVE — SIGNIFICANT CHANGE UP
LYMPHOCYTES # BLD AUTO: 1.69 K/UL — SIGNIFICANT CHANGE UP (ref 1–3.3)
LYMPHOCYTES # BLD AUTO: 28.6 % — SIGNIFICANT CHANGE UP (ref 13–44)
MCHC RBC-ENTMCNC: 24.2 PG — LOW (ref 27–34)
MCHC RBC-ENTMCNC: 31.2 G/DL — LOW (ref 32–36)
MCV RBC AUTO: 77.3 FL — LOW (ref 80–100)
MONOCYTES # BLD AUTO: 0.37 K/UL — SIGNIFICANT CHANGE UP (ref 0–0.9)
MONOCYTES NFR BLD AUTO: 6.3 % — SIGNIFICANT CHANGE UP (ref 2–14)
NEUTROPHILS # BLD AUTO: 3.72 K/UL — SIGNIFICANT CHANGE UP (ref 1.8–7.4)
NEUTROPHILS NFR BLD AUTO: 62.9 % — SIGNIFICANT CHANGE UP (ref 43–77)
NITRITE UR-MCNC: NEGATIVE — SIGNIFICANT CHANGE UP
PH UR: 5.5 — SIGNIFICANT CHANGE UP (ref 5–8)
PLATELET # BLD AUTO: 280 K/UL — SIGNIFICANT CHANGE UP (ref 150–400)
POTASSIUM SERPL-MCNC: 3.9 MMOL/L — SIGNIFICANT CHANGE UP (ref 3.5–5.3)
POTASSIUM SERPL-SCNC: 3.9 MMOL/L — SIGNIFICANT CHANGE UP (ref 3.5–5.3)
PROT UR-MCNC: SIGNIFICANT CHANGE UP MG/DL
RBC # BLD: 5.34 M/UL — SIGNIFICANT CHANGE UP (ref 4.2–5.8)
RBC # FLD: 15.3 % — HIGH (ref 10.3–14.5)
RETICS #: 72.1 K/UL — SIGNIFICANT CHANGE UP (ref 25–125)
RETICS/RBC NFR: 1.4 % — SIGNIFICANT CHANGE UP (ref 0.5–2.5)
RH IG SCN BLD-IMP: POSITIVE — SIGNIFICANT CHANGE UP
SODIUM SERPL-SCNC: 138 MMOL/L — SIGNIFICANT CHANGE UP (ref 135–145)
SP GR SPEC: 1.04 — HIGH (ref 1–1.03)
TIBC SERPL-MCNC: 335 UG/DL — SIGNIFICANT CHANGE UP (ref 220–430)
UIBC SERPL-MCNC: 304 UG/DL — SIGNIFICANT CHANGE UP (ref 110–370)
UROBILINOGEN FLD QL: 1 MG/DL — SIGNIFICANT CHANGE UP (ref 0.2–1)
WBC # BLD: 5.91 K/UL — SIGNIFICANT CHANGE UP (ref 3.8–10.5)
WBC # FLD AUTO: 5.91 K/UL — SIGNIFICANT CHANGE UP (ref 3.8–10.5)

## 2025-04-18 RX ORDER — TRAMADOL HYDROCHLORIDE 50 MG/1
50 TABLET, FILM COATED ORAL ONCE
Refills: 0 | Status: DISCONTINUED | OUTPATIENT
Start: 2025-05-08 | End: 2025-05-08

## 2025-04-18 NOTE — H&P PST ADULT - ATTENDING COMMENTS
I have consented the patient for LEFT CELSO, robotic assisted. We discussed risks, benefits and alternatives. Rationale of care was reviewed and all questions were answered. Surgical risks include but are not limited to infection (in particular given his hx of left forearm osteomyelitis and IVDU), bleeding, nerve damage, chronic pain, VTE, LLD, dislocation, fracture, limited ROM, weakness, hardware failure, loss of life/limb, and need for further surgical procedures.  The patient understood all of this and would like to proceed.

## 2025-04-18 NOTE — H&P PST ADULT - NSICDXPASTMEDICALHX_GEN_ALL_CORE_FT
PAST MEDICAL HISTORY:  Anemia     Chronic osteomyelitis of right ulna     Fracture of radius     History of heroin abuse last use 2018    Lumbar herniated disc     Osteoarthritis      PAST MEDICAL HISTORY:  Anemia     Chronic osteomyelitis of right ulna     Fracture of radius     History of heroin abuse last use 2018    Lumbar herniated disc     Obese     Osteoarthritis     Osteomyelitis of left ulna      PAST MEDICAL HISTORY:  Anemia     Chronic osteomyelitis of right ulna     Fracture of radius     History of heroin abuse last use 2018    Lumbar herniated disc     Lumbar radiculopathy     Obese     Osteoarthritis     Osteomyelitis of left ulna

## 2025-04-18 NOTE — H&P PST ADULT - PROBLEM SELECTOR PLAN 1
Pt. is scheduled for a left total hip arthroplasty, direct anterior, anna robotic assisted 5/8/25.  Pt. verbalized understanding of instructions and that Chlorhexidine is for external use.  PST CBC, BMP, HgBA1c, UA, UC/S, MRSA, and T&S.

## 2025-04-18 NOTE — H&P PST ADULT - ASSESSMENT
Pt. is a 41 yo male with left hip osteoarthritis.   Pt. is a 41 yo male with left hip osteoarthritis.      Pt. is being followed by ID for osteomyelitis of the left forearm.  Last ID note is in the chart.

## 2025-04-18 NOTE — H&P PST ADULT - MUSCULOSKELETAL COMMENTS
Right leg 2 + non pitting edema, pt. states because he favors that leg since the left leg is in so much pain

## 2025-04-18 NOTE — H&P PST ADULT - HISTORY OF PRESENT ILLNESS
Pt. is a 41 yo male with a PMHX of anemia and substance abuse. Pt. is a 39 yo male with a PMHX of anemia and heroin abuse.  Pt. presents to PST for unilateral primary osteoarthritis left hip. Pt. is a 41 yo male with a PMHX of anemia and heroin abuse.  Pt. presents to PST for unilateral primary osteoarthritis left hip to be evaluated for left total hip arthroplasty, direct anterior, RICHARD robotic assisted.      From Surgeon's note:"40M w/ end stage L hip DJD, indicated for L DAA CELSO. He continues to have elevated inflammatory markers though, and with his history of left forearm osteomyelitis, an active infection must be ruled out before proceeding with surgery. Thus far his blood cultures, urine cultures and a left hip aspiration have not shown any evidence of infection. He has elevated CRP may be reflective of chronic inflammation rather than an active infection. Still, given his history he understands that the risks of surgery, in particular infection, are higher than normal given his history. He says he is so miserable with extreme left hip pain that he is willing to take all risks including loss of life and limb. I agreed to proceed with surgery even though he is high risk, given his extreme disability, and now objective evidence of infections at this time. From my standpoint, I still would like an evaluation from infectious disease to determine if there are any other diagnostic tests to be performed prior to surgery and to decide on an antibiotic plan for him for postoperative care. Given his high risk I would prefer possible 6 weeks of postoperative antibiotics, TBD. I have also recommended a left hip MRI with and without contrast to rule out any underlying collections. He agreed with this plan and would like to proceed."    Pt. has had steroid injections and Synvisc to his left hip without any significant relief.

## 2025-04-18 NOTE — H&P PST ADULT - NEGATIVE GENERAL SYMPTOMS
Returned call to Arianna. She states she started spironolactone on 12/8. On 12/10 she noticed her chest was red and bumpy. She used hydrocortisone 1% cream she had OTC on the rash which she felt didn't help. She then used cortisone 10 which has been helping. She states the rash is not red or bumpy anymore but she does still have itching. Reports itching under her chin and 'on the sides near her ears.' She is wondering if there is anything else she can try to help with the itching. She states she hasn't tried anything oral to help. She forgot to mention this to dr reina at her appointment with him this morning. Reports no additional new medications or changes.  Will check with provider and call her back with plan.    no weight loss/no weight gain/no fatigue

## 2025-04-19 ENCOUNTER — APPOINTMENT (OUTPATIENT)
Dept: CT IMAGING | Facility: CLINIC | Age: 40
End: 2025-04-19

## 2025-04-19 ENCOUNTER — OUTPATIENT (OUTPATIENT)
Dept: OUTPATIENT SERVICES | Facility: HOSPITAL | Age: 40
LOS: 1 days | End: 2025-04-19
Payer: COMMERCIAL

## 2025-04-19 DIAGNOSIS — Z98.890 OTHER SPECIFIED POSTPROCEDURAL STATES: Chronic | ICD-10-CM

## 2025-04-19 DIAGNOSIS — M16.12 UNILATERAL PRIMARY OSTEOARTHRITIS, LEFT HIP: ICD-10-CM

## 2025-04-19 LAB
MRSA PCR RESULT.: SIGNIFICANT CHANGE UP
S AUREUS DNA NOSE QL NAA+PROBE: DETECTED

## 2025-04-19 PROCEDURE — 73700 CT LOWER EXTREMITY W/O DYE: CPT | Mod: 26,LT

## 2025-04-19 PROCEDURE — 73700 CT LOWER EXTREMITY W/O DYE: CPT

## 2025-04-20 LAB
CULTURE RESULTS: SIGNIFICANT CHANGE UP
SPECIMEN SOURCE: SIGNIFICANT CHANGE UP

## 2025-04-22 RX ORDER — MUPIROCIN 20 MG/G
2 OINTMENT TOPICAL
Qty: 1 | Refills: 0 | Status: ACTIVE | COMMUNITY
Start: 2025-04-22 | End: 1900-01-01

## 2025-04-24 ENCOUNTER — NON-APPOINTMENT (OUTPATIENT)
Age: 40
End: 2025-04-24

## 2025-05-07 NOTE — ASU PATIENT PROFILE, ADULT - PAIN LOCATION, PROFILE
left, left arm/hip Sski Pregnancy And Lactation Text: This medication is Pregnancy Category D and isn't considered safe during pregnancy. It is excreted in breast milk.

## 2025-05-07 NOTE — ASU PATIENT PROFILE, ADULT - NSICDXPASTMEDICALHX_GEN_ALL_CORE_FT
PAST MEDICAL HISTORY:  Anemia     Chronic osteomyelitis of right ulna     Fracture of radius     History of heroin abuse last use 2018    Lumbar herniated disc     Lumbar radiculopathy     Obese     Osteoarthritis     Osteomyelitis of left ulna

## 2025-05-07 NOTE — ASU PATIENT PROFILE, ADULT - FALL HARM RISK - RISK INTERVENTIONS

## 2025-05-08 ENCOUNTER — APPOINTMENT (OUTPATIENT)
Dept: ORTHOPEDIC SURGERY | Facility: HOSPITAL | Age: 40
End: 2025-05-08

## 2025-05-08 ENCOUNTER — INPATIENT (INPATIENT)
Facility: HOSPITAL | Age: 40
LOS: 0 days | Discharge: LEFT BEFORE DISCHARGE | End: 2025-05-08
Attending: ORTHOPAEDIC SURGERY | Admitting: ORTHOPAEDIC SURGERY
Payer: MEDICAID

## 2025-05-08 VITALS
DIASTOLIC BLOOD PRESSURE: 98 MMHG | HEART RATE: 91 BPM | RESPIRATION RATE: 18 BRPM | SYSTOLIC BLOOD PRESSURE: 155 MMHG | OXYGEN SATURATION: 97 % | WEIGHT: 223.11 LBS | HEIGHT: 65.5 IN | TEMPERATURE: 98 F

## 2025-05-08 VITALS — RESPIRATION RATE: 18 BRPM | OXYGEN SATURATION: 97 % | HEART RATE: 60 BPM

## 2025-05-08 DIAGNOSIS — Z98.890 OTHER SPECIFIED POSTPROCEDURAL STATES: Chronic | ICD-10-CM

## 2025-05-08 DIAGNOSIS — M16.12 UNILATERAL PRIMARY OSTEOARTHRITIS, LEFT HIP: ICD-10-CM

## 2025-05-08 PROBLEM — L97.919 ULCER OF RIGHT LEG: Status: ACTIVE | Noted: 2025-05-08

## 2025-05-08 LAB
APAP SERPL-MCNC: <10 UG/ML — LOW (ref 15–25)
ETHANOL SERPL-MCNC: <10 MG/DL — SIGNIFICANT CHANGE UP
GLUCOSE BLDC GLUCOMTR-MCNC: 148 MG/DL — HIGH (ref 70–99)
SALICYLATES SERPL-MCNC: <0.3 MG/DL — LOW (ref 15–30)
TOXICOLOGY SCREEN, DRUGS OF ABUSE, SERUM RESULT: SIGNIFICANT CHANGE UP

## 2025-05-08 PROCEDURE — 99233 SBSQ HOSP IP/OBS HIGH 50: CPT

## 2025-05-08 RX ORDER — POVIDONE-IODINE 7.5 %
1 SOLUTION, NON-ORAL TOPICAL ONCE
Refills: 0 | Status: DISCONTINUED | OUTPATIENT
Start: 2025-05-08 | End: 2025-05-08

## 2025-05-08 RX ORDER — B1/B2/B3/B5/B6/B12/VIT C/FOLIC 500-0.5 MG
1 TABLET ORAL DAILY
Refills: 0 | Status: DISCONTINUED | OUTPATIENT
Start: 2025-05-08 | End: 2025-05-08

## 2025-05-08 RX ORDER — GABAPENTIN 400 MG/1
1 CAPSULE ORAL
Refills: 0 | DISCHARGE

## 2025-05-08 RX ORDER — GABAPENTIN 400 MG/1
400 CAPSULE ORAL DAILY
Refills: 0 | Status: DISCONTINUED | OUTPATIENT
Start: 2025-05-08 | End: 2025-05-08

## 2025-05-08 RX ORDER — IBUPROFEN 200 MG
1 TABLET ORAL
Refills: 0 | DISCHARGE

## 2025-05-08 RX ORDER — ACETAMINOPHEN 500 MG/5ML
650 LIQUID (ML) ORAL ONCE
Refills: 0 | Status: COMPLETED | OUTPATIENT
Start: 2025-05-08 | End: 2025-05-08

## 2025-05-08 RX ORDER — ACETAMINOPHEN 500 MG/5ML
650 LIQUID (ML) ORAL EVERY 6 HOURS
Refills: 0 | Status: DISCONTINUED | OUTPATIENT
Start: 2025-05-08 | End: 2025-05-08

## 2025-05-08 RX ORDER — HALOPERIDOL 10 MG/1
5 TABLET ORAL ONCE
Refills: 0 | Status: DISCONTINUED | OUTPATIENT
Start: 2025-05-08 | End: 2025-05-08

## 2025-05-08 RX ORDER — TRAMADOL HYDROCHLORIDE 50 MG/1
50 TABLET, FILM COATED ORAL ONCE
Refills: 0 | Status: DISCONTINUED | OUTPATIENT
Start: 2025-05-08 | End: 2025-05-08

## 2025-05-08 RX ORDER — IBUPROFEN 200 MG
400 TABLET ORAL EVERY 8 HOURS
Refills: 0 | Status: DISCONTINUED | OUTPATIENT
Start: 2025-05-08 | End: 2025-05-08

## 2025-05-08 RX ORDER — ONDANSETRON HCL/PF 4 MG/2 ML
4 VIAL (ML) INJECTION ONCE
Refills: 0 | Status: DISCONTINUED | OUTPATIENT
Start: 2025-05-08 | End: 2025-05-08

## 2025-05-08 RX ORDER — SODIUM CHLORIDE 9 G/1000ML
1000 INJECTION, SOLUTION INTRAVENOUS
Refills: 0 | Status: DISCONTINUED | OUTPATIENT
Start: 2025-05-08 | End: 2025-05-08

## 2025-05-08 RX ADMIN — Medication 650 MILLIGRAM(S): at 13:15

## 2025-05-08 RX ADMIN — Medication 40 MILLIGRAM(S): at 13:16

## 2025-05-08 RX ADMIN — TRAMADOL HYDROCHLORIDE 50 MILLIGRAM(S): 50 TABLET, FILM COATED ORAL at 13:16

## 2025-05-08 NOTE — PROVIDER CONTACT NOTE (OTHER) - ACTION/TREATMENT ORDERED:
Patient refusing care from all personal. patient refused discharge paperwork and refused AMA paperwork at this time. All belongings returned to patient. Pt escorted to lobby in wheelchair by security.

## 2025-05-08 NOTE — CHART NOTE - NSCHARTNOTEFT_GEN_A_CORE
This is to document the nature of the cancellation of today's planned left total hip arthroplasty. This is a 40M w/ hx of IVDU and extensive history of left forearm osteomyelitis who developed severe L hip arthritis, indicated for left CELSO as he failed conservative management. Prior to surgery he had a very thorough workup including labs, imaging, and a left hip IR aspiration which was NGF. His inflammatory markers had been persistently elevated though, thought to be related to generalized inflammation rather than an infection as no source of infection was identified.    Today, the patient was positioned supine and intubated. During positioning he was noted to have a copper right calf brace, which was removed, revealing a large 8X6cm cutaneous ulcer involving the right anterior lower leg. Diagnosis is unclear but may represent an underlying infection, inflammatory condition, or cutaneous malignancy. Given this uncertainty and the risks of developing a periprosthetic infection without a proper workup, the procedure was cancelled.     He will be admitted for further workup with the followin) CBC, CMP, Blood Cx, urine Cx  2) Derm consult for biopsy/culture  3) ID consult  4) FU tox screen due to hx of IVDU. In addition a left 5th digit extra long nail was identified as well. This is to document the nature of the cancellation of today's planned left total hip arthroplasty. This is a 40M w/ hx of IVDU and extensive history of left forearm osteomyelitis who developed severe L hip arthritis, indicated for left CELSO as he failed conservative management. Prior to surgery he had a very thorough workup including labs, imaging, and a left hip IR aspiration which was NGF. His inflammatory markers had been persistently elevated though, thought to be related to generalized inflammation rather than an infection as no source of infection was identified.    Today, the patient was positioned supine and intubated. During positioning he was noted to have a copper right calf brace, which was removed, revealing a large 8X6cm cutaneous ulcer involving the right anterior lower leg. Diagnosis is unclear but may represent an underlying infection, inflammatory condition, or cutaneous malignancy. Given this uncertainty and the risks of developing a periprosthetic infection without a proper workup, the procedure was cancelled.     He will be admitted for further workup with the followin) CBC, CMP, Blood Cx, urine Cx  2) Derm consult for biopsy/culture  3) ID consult  4) FU tox screen due to hx of IVDU. In addition a left 5th digit extra long nail was identified as well.  5) R tib/fib xray  6) RLE CT w/ and w/o contrast This is to document the nature of the cancellation of today's planned left total hip arthroplasty. This is a 40M w/ hx of IVDU and extensive history of left forearm osteomyelitis who developed severe L hip arthritis, indicated for left CELSO as he failed conservative management. Prior to surgery he had a very thorough workup including labs, imaging, and a left hip IR aspiration which was NGF. His inflammatory markers had been persistently elevated though, thought to be related to generalized inflammation rather than an infection as no source of infection was identified.    Today, the patient was positioned supine and intubated. During positioning he was noted to have a copper right calf brace, which was removed, revealing a large 8X6cm cutaneous ulcer involving the right anterior lower leg. Diagnosis is unclear but may represent an underlying infection, inflammatory condition, or cutaneous malignancy. Given this uncertainty and the risks of developing a periprosthetic infection without a proper workup, the procedure was cancelled.     He will be admitted for further workup with the followin) CBC, CMP, Blood Cx, urine Cx  2) Derm consult for biopsy/culture  3) ID consult  4) FU tox screen due to hx of IVDU. In addition a left 5th digit extra long nail was identified as well.  5) R tib/fib xray  6) RLE CT w/ and w/o contrast        Time for this encounter: 50minutes

## 2025-05-08 NOTE — ASU PREOP CHECKLIST - 1.
fall risk fall risk, wearing black support hose on right lower leg 'for my circulation" denies any open sores or skin tear.

## 2025-05-08 NOTE — PROVIDER CONTACT NOTE (OTHER) - ASSESSMENT
Patient awake at this time. MD Shashank at bedside speaking with patient and advising him that surgery was cancelled due to right shin wound found in OR after induction. Patient became very agitated and violent, ROSSY called. Patient stated he is going to leave and refusing care from all personal. patient refused discharge paperwork and refused AMA paperwork at this time. All belongings returned to patient, PIV removed and dressing in place CD[facility]. Patient family member called and enroute to hospital, patient refused to wait on unit. Patient escorted to lobby in wheelchair accompanied by two security officers.

## 2025-05-08 NOTE — ASU PREOP CHECKLIST - 5.
left forearm noted to have a black ace wrap, arm deformed from previous surgeries and to"stabilize the arm" as per pt

## 2025-05-08 NOTE — PROVIDER CONTACT NOTE (OTHER) - SITUATION
Patient awake at this time. MD Shashank called to bedside to speak to patient and advise him of cancelled surgery.

## 2025-05-09 DIAGNOSIS — L97.919 NON-PRESSURE CHRONIC ULCER OF UNSPECIFIED PART OF RIGHT LOWER LEG WITH UNSPECIFIED SEVERITY: ICD-10-CM

## 2025-05-09 DIAGNOSIS — M16.12 UNILATERAL PRIMARY OSTEOARTHRITIS, LEFT HIP: ICD-10-CM

## 2025-05-09 PROBLEM — M86.9 OSTEOMYELITIS, UNSPECIFIED: Chronic | Status: ACTIVE | Noted: 2025-04-18

## 2025-05-12 NOTE — ED PROVIDER NOTE - CPE EDP NEURO NORM
Attempted to call Nely @ Dr Fernandes's office  Number provided was for a fax, not a phone   normal...

## 2025-05-16 ENCOUNTER — APPOINTMENT (OUTPATIENT)
Dept: ORTHOPEDIC SURGERY | Facility: CLINIC | Age: 40
End: 2025-05-16
Payer: MEDICAID

## 2025-05-16 VITALS — BODY MASS INDEX: 35.36 KG/M2 | WEIGHT: 220 LBS | HEIGHT: 66 IN

## 2025-05-16 DIAGNOSIS — M16.12 UNILATERAL PRIMARY OSTEOARTHRITIS, LEFT HIP: ICD-10-CM

## 2025-05-16 PROBLEM — M54.16 RADICULOPATHY, LUMBAR REGION: Chronic | Status: ACTIVE | Noted: 2025-04-18

## 2025-05-16 PROBLEM — E66.9 OBESITY, UNSPECIFIED: Chronic | Status: ACTIVE | Noted: 2025-04-18

## 2025-05-16 PROCEDURE — 99203 OFFICE O/P NEW LOW 30 MIN: CPT

## 2025-05-19 ENCOUNTER — APPOINTMENT (OUTPATIENT)
Dept: ORTHOPEDIC SURGERY | Facility: CLINIC | Age: 40
End: 2025-05-19

## (undated) DEVICE — DRILL BIT STRYKER 2MM

## (undated) DEVICE — FOLEY TRAY 16FR 5CC LF UMETER CLOSED

## (undated) DEVICE — DRAPE 3/4 SHEET 52X76"

## (undated) DEVICE — DRSG STOCKINETTE IMPERVIOUS LG

## (undated) DEVICE — WARMING BLANKET FULL UNDERBODY

## (undated) DEVICE — MERCIAN VISABILITY BACKROUND YELLOW

## (undated) DEVICE — ELCTR PLASMA BLADE X 3.0S WIDE TIP

## (undated) DEVICE — CANISTER DISPOSABLE THIN WALL 3000CC

## (undated) DEVICE — DOPPLER PROBE  CABLE

## (undated) DEVICE — GLV 7.5 PROTEXIS (WHITE)

## (undated) DEVICE — DRAPE SHOWER CURTAIN ISOLATION

## (undated) DEVICE — NDL COUNTER FOAM AND MAGNET 20-40

## (undated) DEVICE — TOURNIQUET CUFF 24" DUAL PORT SINGLE BLADDER LUER LOCK  (BLACK)

## (undated) DEVICE — DRSG COBAN 4"

## (undated) DEVICE — SUT MONOCRYL 3-0 27" PS-2 UNDYED

## (undated) DEVICE — PACK MINOR WITH LAP

## (undated) DEVICE — Device

## (undated) DEVICE — VENODYNE/SCD SLEEVE CALF MEDIUM

## (undated) DEVICE — ELCTR GROUNDING PAD ADULT COVIDIEN

## (undated) DEVICE — MAKO VIZADISC KNEE TRACKING KIT

## (undated) DEVICE — DRAPE FLUID WARMER 44 X 44"

## (undated) DEVICE — PREP CHLORAPREP HI-LITE ORANGE 26ML

## (undated) DEVICE — PACK MAJOR ABDOMINAL WITH LAP

## (undated) DEVICE — SOL IRR POUR H2O 1500ML

## (undated) DEVICE — DRAIN JACKSON PRATT 10MM FLAT 3/4 NO TROCAR

## (undated) DEVICE — DRSG DERMABOND 0.7ML

## (undated) DEVICE — NDL HYPO SAFE 22G X 1.5" (BLACK)

## (undated) DEVICE — DRSG PREVENA PLUS SYSTEM

## (undated) DEVICE — SUT ETHILON 3-0 18" FS-1

## (undated) DEVICE — ELCTR AQUAMANTYS BIPOLAR SEALER 6.0

## (undated) DEVICE — LONE STAR RETRACTOR RING 12MM BLUNT DISP

## (undated) DEVICE — MAKO DRAPE KIT

## (undated) DEVICE — FRAZIER SUCTION TIP 8FR

## (undated) DEVICE — ANESTHESIA CIRCUIT ADULT HMEF

## (undated) DEVICE — STAPLER SKIN VISI-STAT 35 WIDE

## (undated) DEVICE — SYR LUER LOK 20CC

## (undated) DEVICE — SYR LUER LOK 10CC

## (undated) DEVICE — DRAPE INSTRUMENT POUCH 6.75" X 11"

## (undated) DEVICE — SUT PROLENE 5-0 36" RB-1

## (undated) DEVICE — SUT VICRYL PLUS 0 27" OS-6 UNDYED

## (undated) DEVICE — SUT NYLON 8-0 5" DRM6

## (undated) DEVICE — DRAPE TOWEL BLUE 17" X 24"

## (undated) DEVICE — DRSG SENSA TRAC SMALL

## (undated) DEVICE — DRSG COBAN 6"

## (undated) DEVICE — DRSG KLING 4"

## (undated) DEVICE — SUCTION YANKAUER NO CONTROL VENT

## (undated) DEVICE — DRAPE IOBAN 23" X 23"

## (undated) DEVICE — POSITIONER STRAP ARMBOARD VELCRO TS-30

## (undated) DEVICE — DRSG AQUACEL 3.5 X 6"

## (undated) DEVICE — HOOD T5 PEELAWAY

## (undated) DEVICE — SUT VICRYL 3-0 18" PS-1 UNDYED

## (undated) DEVICE — TOURNIQUET CUFF 18" DUAL PORT SINGLE BLADDER LUER LOCK (BLACK)

## (undated) DEVICE — DRAPE U (BLUE) 60 X 60"

## (undated) DEVICE — GEL ULTRASOUND 0.25L

## (undated) DEVICE — GLV 8 PROTEXIS (WHITE)

## (undated) DEVICE — LIJ-K WIRE TRAY (REQUIRES BILL) (IMPLANT): Type: DURABLE MEDICAL EQUIPMENT

## (undated) DEVICE — PACK FREE FLAP

## (undated) DEVICE — POSITIONER HANA PATIENT CARE KIT

## (undated) DEVICE — MAKO CHECKPOINT KIT FEMORAL / TIBIAL

## (undated) DEVICE — CANNULA ANT CHMBR 27GX22MM

## (undated) DEVICE — DRAPE IOBAN 33" X 23"

## (undated) DEVICE — DRAPE STERI-DRAPE INCISE 23X17"

## (undated) DEVICE — DRAPE SPLIT SHEET 77" X 120"

## (undated) DEVICE — SAW BLADE MICROAIRE SAGITTAL 9.4MMX25.4MMX0.6MM

## (undated) DEVICE — PREP BETADINE SPONGE STICKS

## (undated) DEVICE — NDL HYPO SAFE 18G X 1.5" (PINK)

## (undated) DEVICE — TOURNIQUET ESMARK 6"

## (undated) DEVICE — LABELS BLANK W PEN

## (undated) DEVICE — ELCTR BOVIE TIP BLADE INSULATED 2.75" EDGE

## (undated) DEVICE — DRAIN RESERVOIR FOR JACKSON PRATT 100CC CARDINAL

## (undated) DEVICE — PACK LIJ BASIC ORTHO

## (undated) DEVICE — SUT ETHILON 2-0 30" KS

## (undated) DEVICE — BASIN SET DOUBLE

## (undated) DEVICE — PACK HAND TRAY

## (undated) DEVICE — TUBING SUCTION NONCONDUCTIVE 6MM X 12FT

## (undated) DEVICE — NDL HYPO SAFE 21G X 1.5" (GREEN)

## (undated) DEVICE — SAW BLADE STRYKER SAGITTAL 3 HOLE OSCILLATING

## (undated) DEVICE — WARMING BLANKET FULL ADULT

## (undated) DEVICE — SUT QUILL MONODERM 3-0 30CM 19MM

## (undated) DEVICE — LAP PAD W RING 18 X 18"

## (undated) DEVICE — TOURNIQUET CUFF 34" DUAL PORT W PLC

## (undated) DEVICE — BIPOLAR FORCEP CORD WECK STANDARD 12FT

## (undated) DEVICE — DRAPE C ARM UNIVERSAL

## (undated) DEVICE — WOUND IRR IRRISEPT W 0.5 CHG

## (undated) DEVICE — SOL IRR POUR NS 0.9% 500ML

## (undated) DEVICE — SPEAR SURG EYE WECK-CELL CELOS

## (undated) DEVICE — SUT VICRYL 2-0 27" CT-1 UNDYED

## (undated) DEVICE — DRSG XEROFORM 5 X 9"

## (undated) DEVICE — SYR LUER LOK 3CC

## (undated) DEVICE — DRSG WEBRIL 6"

## (undated) DEVICE — DRSG WEBRIL 4"

## (undated) DEVICE — TOURNIQUET ESMARK 4"

## (undated) DEVICE — LIJ-ZIMMER MESHGRAFTER: Type: DURABLE MEDICAL EQUIPMENT

## (undated) DEVICE — DRAPE LARGE SHEET 72X85"

## (undated) DEVICE — SUT QUILL PDO 2 36CM 40MM

## (undated) DEVICE — SOL IRR POUR NS 0.9% 1500ML

## (undated) DEVICE — DRAPE VARI-LENS2 MICROSCPOPE 68MM

## (undated) DEVICE — GOWN LG